# Patient Record
Sex: FEMALE | Race: BLACK OR AFRICAN AMERICAN | NOT HISPANIC OR LATINO | Employment: STUDENT | URBAN - METROPOLITAN AREA
[De-identification: names, ages, dates, MRNs, and addresses within clinical notes are randomized per-mention and may not be internally consistent; named-entity substitution may affect disease eponyms.]

---

## 2018-09-13 ENCOUNTER — OFFICE VISIT (OUTPATIENT)
Dept: OBSTETRICS AND GYNECOLOGY | Facility: CLINIC | Age: 28
End: 2018-09-13
Payer: MEDICAID

## 2018-09-13 VITALS
BODY MASS INDEX: 35.43 KG/M2 | WEIGHT: 192.56 LBS | DIASTOLIC BLOOD PRESSURE: 80 MMHG | HEIGHT: 62 IN | SYSTOLIC BLOOD PRESSURE: 110 MMHG

## 2018-09-13 DIAGNOSIS — N93.8 DUB (DYSFUNCTIONAL UTERINE BLEEDING): Primary | ICD-10-CM

## 2018-09-13 DIAGNOSIS — R93.89 THICKENED ENDOMETRIUM: ICD-10-CM

## 2018-09-13 DIAGNOSIS — N76.2 ACUTE VULVITIS: ICD-10-CM

## 2018-09-13 LAB
B-HCG UR QL: NEGATIVE
CTP QC/QA: YES

## 2018-09-13 PROCEDURE — 88305 TISSUE EXAM BY PATHOLOGIST: CPT | Performed by: PATHOLOGY

## 2018-09-13 PROCEDURE — 99204 OFFICE O/P NEW MOD 45 MIN: CPT | Mod: 25,S$PBB,, | Performed by: OBSTETRICS & GYNECOLOGY

## 2018-09-13 PROCEDURE — 58100 BIOPSY OF UTERUS LINING: CPT | Mod: PBBFAC,PN | Performed by: OBSTETRICS & GYNECOLOGY

## 2018-09-13 PROCEDURE — 88305 TISSUE EXAM BY PATHOLOGIST: CPT | Mod: 26,,, | Performed by: PATHOLOGY

## 2018-09-13 PROCEDURE — 99999 PR PBB SHADOW E&M-NEW PATIENT-LVL III: CPT | Mod: PBBFAC,,, | Performed by: OBSTETRICS & GYNECOLOGY

## 2018-09-13 PROCEDURE — 99203 OFFICE O/P NEW LOW 30 MIN: CPT | Mod: PBBFAC,PN | Performed by: OBSTETRICS & GYNECOLOGY

## 2018-09-13 PROCEDURE — 87660 TRICHOMONAS VAGIN DIR PROBE: CPT

## 2018-09-13 PROCEDURE — 81025 URINE PREGNANCY TEST: CPT | Mod: PBBFAC,PN | Performed by: OBSTETRICS & GYNECOLOGY

## 2018-09-13 RX ORDER — CLOTRIMAZOLE AND BETAMETHASONE DIPROPIONATE 10; .64 MG/G; MG/G
CREAM TOPICAL
Qty: 45 G | Refills: 3 | Status: SHIPPED | OUTPATIENT
Start: 2018-09-13

## 2018-09-13 RX ORDER — NORETHINDRONE 5 MG/1
5 TABLET ORAL DAILY
Qty: 10 TABLET | Refills: 12 | Status: SHIPPED | OUTPATIENT
Start: 2018-09-13 | End: 2018-10-13

## 2018-09-13 NOTE — PROGRESS NOTES
-FINAL PATHOLOGIC DIAGNOSIS  Uterus, endometrial biopsy:  -Poorly developed secretory phase endometrium with glandular and stromal breakdown, negative for hyperplasia or  malignancy.  -Mucous and benign inflamed endocervical epithelium.     9/13/2018    Trichomonas vaginalis Negative   Gardnerella vaginalis Positive (A)   Candida sp Positive (A)   YEAST AND BV    PT SEEN BY OLVIN MULLEN NP IN CLINIC FOR ON/OFF B PELVIC PAIN.  HAD U/S WITH TH ES. PT HAS IRREGULAR MENSES AND OFTEN SKIPS MONTHS.  TX FOR BV AND FINISHED FLAGYL Friday. NOW WITH VULVAR ITCH AND BURNING.    08/15/18 U/S  Uterus:  Size: 9.7 x 3.7 x 4.7 cm  Masses: None  Endometrium: Thickened, measuring 14.6 mm.  Right ovary:  Size: 3.7 x 3.5 x 3 cm  Appearance: Normally distributed follicles.  Vascular flow: Normal.  Left ovary:  Size: 3.4 x 2.6 x 3.5 cm  Appearance: Normally distributed follicles.  Vascular Flow: Normal.  Free Fluid:  None.      Impression     1. Thickened homogeneous endometrium.  Finding of concern for endometrial hyperplasia.  2. No ovarian abnormality seen.     ROS:  GENERAL: No fever, chills, fatigability or weight loss.  VULVAR: No pain, no lesions and no itching.  VAGINAL: No relaxation, no itching, no discharge, no abnormal bleeding and no lesions.  ABDOMEN: No abdominal pain. Denies nausea. Denies vomiting. No diarrhea. No constipation  BREAST: Denies pain. No lumps. No discharge.  URINARY: No incontinence, no nocturia, no frequency and no dysuria.  CARDIOVASCULAR: No chest pain. No shortness of breath. No leg cramps.  NEUROLOGICAL: No headaches. No vision changes.  The remainder of the review of systems was negative.    PE:  General Appearance: overweight And Well developed. Well nourished. In no acute distress.  Vulva: Lesions: No.  Urethral Meatus: Normal size. Normal location. No lesions. No prolapse.  Urethra: No masses. No tenderness. No prolapse. No scarring.  Bladder: No masses. No tenderness.  Vagina: Mucosa NI:yes  discharge no, atrophy no, cystocele no or rectocele no.  Cervix: Lesion: no  Stenotic: no Cervical motion tenderness: no  Uterus: Uterus size: 7 weeks. Support good. Uterus size: Normal  Adnexa: Masses: No Tenderness: No CDS Nodularity: No  Abdomen: overweight No masses. No tenderness.  CHEST: CTA B  HEART: RRR  EXT: NO EDEMA        PROCEDURES:    PLAN:     DIAGNOSIS:  1. DUB (dysfunctional uterine bleeding)    2. Thickened endometrium    3. Acute vulvitis        MEDICATIONS & ORDERS:  Orders Placed This Encounter    Biopsy (Gynecological)    Vaginosis Screen by DNA Probe    POCT Urine Pregnancy    Tissue Specimen To Pathology, Obstetrics/Gynecology    norethindrone (AYGESTIN) 5 mg Tab    clotrimazole-betamethasone 1-0.05% (LOTRISONE) cream    metroNIDAZOLE (FLAGYL) 500 MG tablet    terconazole (TERAZOL 7) 0.4 % Crea     20 MIN D/W PT ON AUB TX AND OPTIONS.    FOLLOW-UP: With me in PRN

## 2018-09-13 NOTE — PROCEDURES
Biopsy (Gynecological)  Date/Time: 2018 4:05 PM  Performed by: Gerrado Waddell Jr., MD  Authorized by: Gerardo Waddell Jr., MD     Consent Done?:  Yes (Written)  Local anesthesia used?: No      Biopsy Location:  Uterus  Estimated blood loss (cc):  2   Patient tolerated the procedure well with no immediate complications.        CC: ENDOMETRIAL BIOPSPY    Ron Devine is a 27 y.o. female  presents for an endometrial biopsy secondary to  es.   UPT is negative.    PRE ENDOMETRIAL BIOPSY COUNSELING:  The patient was informed of the risk of bleeding, infection, uterine perforation and pain and that the test will rule-out endometrial cancer with accuracy greater than 95%. She was counseled on the alternatives to endometrial biopsy and agrees to proceed.    TIME OUT PERFORMED.  The cervix was visualized with a speculum.  A single tooth tenaculum was placed on the anterior lip prior to the biopsy.  A sterile endometrial pipelle was passed without difficulty to a depth of 7 cm.  Scant endometrial tissue was obtained.    The specimen was placed in formalyn and sent to Pathology for histology evaluation. The patient tolerated the procedure well    ASSESSMENT: [facility]    POST ENDOMETRIAL BIOPSY COUNSELING:  Manage post biopsy cramping with NSAIDs or Tyleno.  Expect spotting or light bleeding for a few days.  Report bleeding heavier than a period, fever > 101.0 F, worsening pain or a foul smelling vaginal discharge.    Counseling lasted approximately 15 minutes and all her questions were answered.    FOLLOW-UP: Pending biopsy results.

## 2018-09-14 LAB
CANDIDA RRNA VAG QL PROBE: POSITIVE
G VAGINALIS RRNA GENITAL QL PROBE: POSITIVE
T VAGINALIS RRNA GENITAL QL PROBE: NEGATIVE

## 2018-09-14 RX ORDER — METRONIDAZOLE 500 MG/1
500 TABLET ORAL 2 TIMES DAILY
Qty: 14 TABLET | Refills: 0 | Status: SHIPPED | OUTPATIENT
Start: 2018-09-14 | End: 2018-09-21

## 2018-09-14 RX ORDER — TERCONAZOLE 4 MG/G
1 CREAM VAGINAL NIGHTLY
Qty: 1 TUBE | Refills: 1 | Status: SHIPPED | OUTPATIENT
Start: 2018-09-14 | End: 2018-09-21

## 2018-09-17 ENCOUNTER — TELEPHONE (OUTPATIENT)
Dept: OBSTETRICS AND GYNECOLOGY | Facility: CLINIC | Age: 28
End: 2018-09-17

## 2018-09-17 NOTE — TELEPHONE ENCOUNTER
----- Message from Gerardo Waddell Jr., MD sent at 9/14/2018  6:13 PM CDT -----  CALL WITH BV AND YEAST

## 2018-09-17 NOTE — TELEPHONE ENCOUNTER
----- Message from Coreen Cloud LPN sent at 9/17/2018 12:41 PM CDT -----  Contact: pt      ----- Message -----  From: Marine Morales  Sent: 9/17/2018  12:39 PM  To: [facility]air[facility] MEDINA Jr Staff              Name of Who is Calling: JILL SEE [[id number]]      What is the request in detail:pt calling for test results.. Please advise      Can the clinic reply by [facility]:no      What Number to Call Back if not in [facility]: [phone]

## 2018-09-17 NOTE — TELEPHONE ENCOUNTER
Spoke with pt. Pt notified of bv and yeast. Pt states she has picked up the medication. Pt verbalized understanding.

## 2021-07-05 ENCOUNTER — HOSPITAL ENCOUNTER (EMERGENCY)
Facility: OTHER | Age: 31
Discharge: HOME | End: 2021-07-05
Attending: EMERGENCY MEDICINE
Payer: MEDICAID

## 2021-07-05 VITALS
TEMPERATURE: 99 F | WEIGHT: 178 LBS | BODY MASS INDEX: 33.61 KG/M2 | DIASTOLIC BLOOD PRESSURE: 74 MMHG | OXYGEN SATURATION: 98 % | HEIGHT: 61 IN | RESPIRATION RATE: 17 BRPM | HEART RATE: 68 BPM | SYSTOLIC BLOOD PRESSURE: 120 MMHG

## 2021-07-05 DIAGNOSIS — M79.644 FINGER PAIN, RIGHT: Primary | ICD-10-CM

## 2021-07-05 LAB
B-HCG UR QL: NEGATIVE
CTP QC/QA: YES
HCV AB SERPL QL IA: NEGATIVE
HIV 1+2 AB+HIV1 P24 AG SERPL QL IA: NEGATIVE

## 2021-07-05 PROCEDURE — 25000003 PHARM REV CODE 250: Performed by: PHYSICIAN ASSISTANT

## 2021-07-05 PROCEDURE — 99284 EMERGENCY DEPT VISIT MOD MDM: CPT

## 2021-07-05 PROCEDURE — 81025 URINE PREGNANCY TEST: CPT | Performed by: PHYSICIAN ASSISTANT

## 2021-07-05 PROCEDURE — 87389 HIV-1 AG W/HIV-1&-2 AB AG IA: CPT | Performed by: EMERGENCY MEDICINE

## 2021-07-05 PROCEDURE — 86803 HEPATITIS C AB TEST: CPT | Performed by: EMERGENCY MEDICINE

## 2021-07-05 RX ORDER — KETOROLAC TROMETHAMINE 10 MG/1
10 TABLET, FILM COATED ORAL
Status: COMPLETED | OUTPATIENT
Start: 2021-07-05 | End: 2021-07-05

## 2021-07-05 RX ORDER — AMOXICILLIN AND CLAVULANATE POTASSIUM 875; 125 MG/1; MG/1
1 TABLET, FILM COATED ORAL
Status: COMPLETED | OUTPATIENT
Start: 2021-07-05 | End: 2021-07-05

## 2021-07-05 RX ORDER — KETOROLAC TROMETHAMINE 10 MG/1
10 TABLET, FILM COATED ORAL EVERY 6 HOURS
Qty: 12 TABLET | Refills: 0 | Status: SHIPPED | OUTPATIENT
Start: 2021-07-05 | End: 2021-07-08

## 2021-07-05 RX ORDER — AMOXICILLIN AND CLAVULANATE POTASSIUM 875; 125 MG/1; MG/1
1 TABLET, FILM COATED ORAL 2 TIMES DAILY
Qty: 14 TABLET | Refills: 0 | Status: SHIPPED | OUTPATIENT
Start: 2021-07-05

## 2021-07-05 RX ADMIN — KETOROLAC TROMETHAMINE 10 MG: 10 TABLET, FILM COATED ORAL at 11:07

## 2021-07-05 RX ADMIN — AMOXICILLIN AND CLAVULANATE POTASSIUM 1 TABLET: 875; 125 TABLET, FILM COATED ORAL at 11:07

## 2022-02-01 ENCOUNTER — TELEPHONE (OUTPATIENT)
Dept: PRIMARY CARE CLINIC | Facility: CLINIC | Age: 32
End: 2022-02-01
Payer: MEDICAID

## 2022-02-01 NOTE — TELEPHONE ENCOUNTER
----- Message from Pilar Argueat sent at 2022  7:44 AM CST -----  Regarding: Same Day Appointment  Appointment Request  Ron Devine  Sent:   5:25 PM  To: [facility]     Ron Devine  MRN: [id number] : 1990  Pt Home: [phone]    Entered: [phone]        Message    Appointment Request From: Ron Devine    With Provider: Gisela Rollins    Preferred Date Range: 2022 - 2022    Preferred Times: Any Time    Reason for visit: Back / Neck injury    Comments:  Back and neck injury at work. Back and neck aches and sore.

## 2022-07-20 NOTE — TELEPHONE ENCOUNTER
Caller: Kami Wallis    Relationship: Emergency Contact    Best call back number: [phone]    What is the medical concern/diagnosis: PATIENT IN WHEEL CHAIR, NEEDS HELP WITH KNEE AND BACK     What specialty or service is being requested: HOME HEALTH PHYSICAL THERAPY AND NURSING     What is the provider, practice or medical service name: [facility]    Spoke with pt. Notified that the soonest available apt. For a New pt. Is going to be 3/31 with Dr. Shelton pt. Declined will find a sooner apt.

## 2023-04-19 ENCOUNTER — PATIENT MESSAGE (OUTPATIENT)
Dept: RESEARCH | Facility: HOSPITAL | Age: 33
End: 2023-04-19
Payer: MEDICAID

## 2024-09-03 ENCOUNTER — CLINICAL SUPPORT (OUTPATIENT)
Dept: OTHER | Facility: CLINIC | Age: 34
End: 2024-09-03
Payer: COMMERCIAL

## 2024-09-03 DIAGNOSIS — Z00.8 ENCOUNTER FOR OTHER GENERAL EXAMINATION: ICD-10-CM

## 2024-09-03 PROCEDURE — 99401 PREV MED CNSL INDIV APPRX 15: CPT | Mod: S$GLB,,, | Performed by: INTERNAL MEDICINE

## 2024-09-03 PROCEDURE — 82947 ASSAY GLUCOSE BLOOD QUANT: CPT | Mod: QW,S$GLB,, | Performed by: INTERNAL MEDICINE

## 2024-09-03 PROCEDURE — 80061 LIPID PANEL: CPT | Mod: QW,S$GLB,, | Performed by: INTERNAL MEDICINE

## 2024-09-04 VITALS
DIASTOLIC BLOOD PRESSURE: 75 MMHG | HEIGHT: 60 IN | BODY MASS INDEX: 37.5 KG/M2 | SYSTOLIC BLOOD PRESSURE: 121 MMHG | WEIGHT: 191 LBS

## 2024-09-04 LAB
GLUCOSE SERPL-MCNC: 90 MG/DL (ref 60–140)
HDLC SERPL-MCNC: 58 MG/DL
POC CHOLESTEROL, LDL (DOCK): 80 MG/DL
POC CHOLESTEROL, TOTAL: 158 MG/DL
TRIGL SERPL-MCNC: 110 MG/DL

## 2025-02-12 ENCOUNTER — HOSPITAL ENCOUNTER (EMERGENCY)
Facility: OTHER | Age: 35
Discharge: HOME | End: 2025-02-12
Attending: EMERGENCY MEDICINE
Payer: COMMERCIAL

## 2025-02-12 VITALS
OXYGEN SATURATION: 98 % | RESPIRATION RATE: 18 BRPM | DIASTOLIC BLOOD PRESSURE: 72 MMHG | BODY MASS INDEX: 36.52 KG/M2 | SYSTOLIC BLOOD PRESSURE: 129 MMHG | TEMPERATURE: 98 F | WEIGHT: 186 LBS | HEART RATE: 78 BPM | HEIGHT: 60 IN

## 2025-02-12 DIAGNOSIS — M23.91 INTERNAL DERANGEMENT OF RIGHT KNEE: ICD-10-CM
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
## 2025-02-12 DIAGNOSIS — M25.561 RIGHT KNEE PAIN: ICD-10-CM

## 2025-02-12 LAB
B-HCG UR QL: NEGATIVE
CTP QC/QA: YES

## 2025-02-12 PROCEDURE — 99283 EMERGENCY DEPT VISIT LOW MDM: CPT | Mod: 25

## 2025-02-12 PROCEDURE — 25000003 PHARM REV CODE 250: Performed by: EMERGENCY MEDICINE

## 2025-02-12 PROCEDURE — 81025 URINE PREGNANCY TEST: CPT | Performed by: EMERGENCY MEDICINE

## 2025-02-12 RX ORDER — IBUPROFEN 600 MG/1
600 TABLET ORAL
Status: COMPLETED | OUTPATIENT
Start: 2025-02-12 | End: 2025-02-12

## 2025-02-12 RX ADMIN — IBUPROFEN 600 MG: 600 TABLET, FILM COATED ORAL at 01:02

## 2025-02-12 NOTE — ED TRIAGE NOTES
Right knee pain difficulty walking after playing with children on monkey bars, when she let lose she reports she felt something in her right knee crack when she landed.

## 2025-02-12 NOTE — ED PROVIDER NOTES
"Emergency Department Encounter  Provider Note    Ron Devine  [id number]  2025    Evaluation:    History Acquisition:     Chief Complaint   Patient presents with    Knee Pain     Was playing with kids at playground today, was on the monkey bars and when she let go, she came down and felt something crack in her right knee.        History of Present Illness:  Ron Devine is a 34 y.o. female who has a past medical history of Migraines.    The patient presents to the ED due to R knee pain.   Patient reports she was climbing on the monkey bars with her children earlier today. She let go and landed on the ground and reports hearing a "pop" in her knee.   Since then she has had pain and swelling to the R knee. She denies any additional injury. No weakness or numbness. No foot, ankle, or hip pain. She applied an ice pack prior to arrival. She has not taken anything for pain.     Additional historians utilized:  none    Prior medical records were reviewed:   Office visit 2024 for follow-up obesity, menorrhagia    The patient's list of active medical history, family/social history, medications, and allergies as documented has been reviewed.     Past Medical History:   Diagnosis Date    Migraines      Past Surgical History:   Procedure Laterality Date     SECTION  2009    X 2    DILATION AND CURETTAGE OF UTERUS      EAB X 2    TUBAL LIGATION       Family History   Problem Relation Name Age of Onset    Breast cancer Neg Hx      Cancer Neg Hx      Colon cancer Neg Hx      Ovarian cancer Neg Hx       Social History     Socioeconomic History    Marital status: Single   Tobacco Use    Smoking status: Never   Substance and Sexual Activity    Alcohol use: Yes     Comment: OCCASIONAL    Drug use: Not Currently     Types: Marijuana     Comment: "every now and then"    Sexual activity: Yes     Partners: Male     Birth control/protection: None       Medications:  Medication List with Changes/Refills "   Current Medications    AMOXICILLIN-CLAVULANATE 875-125MG (AUGMENTIN) 875-125 MG PER TABLET    Take 1 tablet by mouth 2 (two) times daily.    CLOTRIMAZOLE-BETAMETHASONE 1-0.05% (LOTRISONE) CREAM    Apply to affected area 2 times daily    NORETHINDRONE (AYGESTIN) 5 MG TAB    Take 1 tablet (5 mg total) by mouth once daily. TAKE D 1-10 Q MONTH    ONDANSETRON (ZOFRAN-ODT) 4 MG TBDL    Take 1 tablet (4 mg total) by mouth every 8 (eight) hours as needed (Nausea/Vomiting).    PRENATAL #48-IRON CB&GLU-FA-B6 ORAL    Take by mouth.       Allergies:  Review of patient's allergies indicates:  No Known Allergies      Physical Exam:     Initial Vitals [02/12/25 1237]   BP Pulse Resp Temp SpO2   134/66 81 16 98.1 °F (36.7 °C) 99 %      MAP       --         Physical Exam    Constitutional: She appears well-developed and well-nourished. She is not diaphoretic. No distress.   HENT:   Head: Normocephalic and atraumatic.   Cardiovascular:  Normal rate.           Pulmonary/Chest: No respiratory distress.   Musculoskeletal:         General: Tenderness present. No edema. Normal range of motion.      Right knee: Swelling, effusion and bony tenderness present. No deformity or crepitus. Normal range of motion. Tenderness present over the patellar tendon. Normal alignment. Normal pulse.     Neurological: She is alert and oriented to person, place, and time.   Skin: Skin is warm and dry.   Psychiatric: She has a normal mood and affect. Her behavior is normal. Judgment and thought content normal.         Differential Diagnoses:   Based on available information and initial assessment, Differential Diagnosis includes, but is not limited to:  Fracture, dislocation, compartment syndrome, nerve injury/palsy, vascular injury, DVT, rhabdomyolysis, hemarthrosis, septic joint, cellulitis, bursitis, muscle strain, ligament tear/sprain, laceration, foreign body, abrasion, soft tissue contusion, osteoarthritis.      ED Management:   Procedures    Orders  Placed This Encounter    ORTHOPEDIC BRACING FOR HOME USE - LOWER EXTREMITY    X-Ray Knee 3 View Right    Ambulatory referral/consult to Orthopedics    Ice to affected area    Notify Provider if unable to obtain within 30 minutes of assessment    POCT urine pregnancy    ibuprofen tablet 600 mg          EKG:       Labs:     Labs Reviewed   POCT URINE PREGNANCY       Result Value    POC Preg Test, Ur Negative       Acceptable Yes       Independent review of the labs ordered include:   See ED course    Imaging:     Imaging Results              X-Ray Knee 3 View Right (Final result)  Result time 02/12/25 13:30:24      Final result by Kyler Segovia MD (02/12/25 13:30:24)                   Impression:      1. No acute displaced fracture or dislocation of the knee.      Electronically signed by: Kyler Segovia MD  Date:    02/12/2025  Time:    13:30               Narrative:    EXAMINATION:  XR KNEE 3 VIEW RIGHT    CLINICAL HISTORY:  Pain in right knee    TECHNIQUE:  AP, lateral, and Merchant views of the right knee were performed.    COMPARISON:  None    FINDINGS:  Three views right knee.    No acute displaced fracture or dislocation of the knee.  No radiopaque foreign body.  No significant edema.  No large knee joint effusion.                                         Medications Given:     Medications   ibuprofen tablet 600 mg (600 mg Oral Given 2/12/25 1344)        Medical Decision Making:    Additional Consideration:   Additional testing considered during clinical course: none    Social determinants of health considered during development of treatment plan include: poor access to care    Current co-morbidities considered which impacted clinical decision making: none    Case discussed with additional provider: none    ED Course as of 02/12/25 1346   Wed Feb 12, 2025   1324 SpO2: 99 % [SS]   1324 Resp: 16 [SS]   1324 Pulse: 81 [SS]   1324 Temp Source: Oral [SS]   1324 Temp: 98.1 °F (36.7 °C) [SS]    1324 BP: 134/66  Vitals reassuring [SS]   1324 X-Ray Knee 3 View Right  Knee x-ray independently interpreted: no fracture or dislocation.  Agree with radiologist interpretation.    [SS]      ED Course User Index  [SS] Blade Vazquez MD            Medical Decision Making  34-year-old female with right knee pain after fall.  X-rays without fracture.  Will discharge with supportive care and ortho referral due to possible internal derangement/ligamentous injury.    Problems Addressed:  Fall from playground equipment, initial encounter: acute illness or injury  Internal derangement of right knee: acute illness or injury  Right knee pain: acute illness or injury    Amount and/or Complexity of Data Reviewed  Labs: ordered. Decision-making details documented in ED Course.  Radiology: ordered and independent interpretation performed. Decision-making details documented in ED Course.    Risk  OTC drugs.  Prescription drug management.  Diagnosis or treatment significantly limited by social determinants of health.        Clinical Impression:       ICD-10-CM ICD-9-CM   1. Fall from playground equipment, initial encounter  W09.8XXA E884.0   2. Right knee pain  M25.561 719.46   3. Internal derangement of right knee  M23.91 717.9         Follow-up Information       Follow up With Specialties Details Why Contact Info    Gisela Rollins NP Family Medicine Schedule an appointment as soon as possible for a visit   [address] [place] [address]      Orthopedics, [facility] Orthopedic Surgery Schedule an appointment as soon as possible for a visit   [address] [facility] [address] [phone]               ED Disposition Condition    Discharge Stable              On re-evaluation, the patient's status has improved.  PCP/Ortho follow-up as soon as possible was recommended.    After taking into careful account the patient's history, physical exam findings, as well as empirical and objective data  obtained throughout ED workup, I feel no emergent medical condition has been identified. No further evaluation or admission was felt to be required, and the patient is stable for discharge from the ED. The patient and any additional family present were updated with test results, overall clinical impression, and recommended further plan of care, including discharge instructions as provided and outpatient follow-up for continued evaluation and management as needed. All questions were answered. The patient expressed understanding and agreed with current plan for discharge and follow-up plan of care. Strict ED return precautions were provided, including return/worsening of current symptoms, new symptoms, or any other concerns.       Blade Vazquez MD  02/13/25 0469

## 2025-02-12 NOTE — Clinical Note
"Ron Wood" Nilson was seen and treated in our emergency department on 2/12/2025.  She may return to work on 02/17/2025.       If you have any questions or concerns, please don't hesitate to call.      leona MELENDEZ    "

## 2025-02-12 NOTE — Clinical Note
"Ron Wood" Nilson was seen and treated in our emergency department on 2/12/2025.  She may return to work on 02/14/2025.       If you have any questions or concerns, please don't hesitate to call.      leona MELENDEZ    "

## 2025-02-12 NOTE — DISCHARGE INSTRUCTIONS
Thank you for choosing [facility]!     Our goal in the Emergency Department is to always provide outstanding medical care. You may receive a survey by mail or e-mail in the next week regarding your experience today. We would greatly appreciate you completing and returning the survey. Your feedback provides us with a way to recognize our staff who provide very good care, and it helps us learn how to improve when your experience was below our aspiration of excellence.      It is important to remember that some problems are difficult to diagnose and may not be found during your first visit. Be sure to follow up with your primary care doctor and review any labs/imaging that was performed during your visit with them. If you do not have a primary care doctor, you may contact the one listed on your discharge paperwork, or you may also call the [facility] Appointment Desk at [phone] to schedule an appointment.     All medications may potentially have side effects and it is impossible to predict which medications may give you side effects. If you feel that you are having a negative effect of any medication you should immediately stop taking them and seek medical attention.  Do not drive or make any important decisions for 24 hours if you have received any pain medications, sedatives or mood altering drugs during your ER visit.    We appreciate you trusting us with your medical care. We will be happy to take care of you for all of your future medical needs. You may return to the ER at any time for any new/concerning symptoms, worsening condition, or failure to improve. We hope you feel better soon.     Sincerely,    Blade Vazquez Jr., MD  Board-Certified Emergency Medicine Physician  [facility]

## 2025-02-13 ENCOUNTER — OFFICE VISIT (OUTPATIENT)
Dept: ORTHOPEDICS | Facility: CLINIC | Age: 35
End: 2025-02-13
Payer: COMMERCIAL

## 2025-02-13 VITALS — WEIGHT: 186.06 LBS | HEIGHT: 60 IN | BODY MASS INDEX: 36.53 KG/M2
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
## 2025-02-13 DIAGNOSIS — M25.561 RIGHT KNEE PAIN: ICD-10-CM

## 2025-02-13 DIAGNOSIS — S83.411A SPRAIN OF MEDIAL COLLATERAL LIGAMENT OF RIGHT KNEE, INITIAL ENCOUNTER: Primary | ICD-10-CM

## 2025-02-13 PROCEDURE — 3008F BODY MASS INDEX DOCD: CPT | Mod: CPTII,S$GLB,,

## 2025-02-13 PROCEDURE — 99213 OFFICE O/P EST LOW 20 MIN: CPT | Mod: S$GLB,,,

## 2025-02-13 PROCEDURE — 99999 PR PBB SHADOW E&M-EST. PATIENT-LVL III: CPT | Mod: PBBFAC,,,

## 2025-02-13 PROCEDURE — 1160F RVW MEDS BY RX/DR IN RCRD: CPT | Mod: CPTII,S$GLB,,

## 2025-02-13 PROCEDURE — 1159F MED LIST DOCD IN RCRD: CPT | Mod: CPTII,S$GLB,,

## 2025-02-13 NOTE — PROGRESS NOTES
SUBJECTIVE:     Chief Complaint & History of Present Illness:  History of Present Illness    CHIEF COMPLAINT:  - Right knee injury    HPI:  Ms. Devine presents for evaluation of a knee injury that occurred yesterday while playing outside. She reports attempting to jump down from monkey bars, resulting in her knee twisting outward as her body went in the opposite direction.Initially, she could put little to no pressure on the affected leg, but notes some improvement since yesterday, reporting she can now put a little pressure on it. She describes pain in the knee area diffusely. She mentions feeling as though her kneecap may have dislocated during the incident. Pain is both in the front and back of the knee, with the most significant discomfort in the back. She denies any recent knee issues, though mentions having had knee problems in high school. Ms. Devine was seen in the Emergency Department yesterday, where she was given a compressive wrap and ibuprofen. She reports using a double brace, combining the ED-provided brace with one she already owned. She notes some decrease in swelling since yesterday. She has been using crutches for ambulation.     PREVIOUS TREATMENTS:  - Ms. Devine was given an HVAN brace in the ED yesterday  - Ms. Devine had a pre-existing brace which she used in addition to the HVAN brace (double braced)  - Ms. Devine has been using ice and elevation since the ED visit yesterday, with minimal improvement noted      ROS:  Musculoskeletal: +joint pain          Past Medical History:   Diagnosis Date    Migraines        Past Surgical History:   Procedure Laterality Date     SECTION  2009    X 2    DILATION AND CURETTAGE OF UTERUS      EAB X 2    TUBAL LIGATION         Family History   Problem Relation Name Age of Onset    Breast cancer Neg Hx      Cancer Neg Hx      Colon cancer Neg Hx      Ovarian cancer Neg Hx         Review of patient's allergies indicates:  No Known Allergies        Current  Outpatient Medications:     amoxicillin-clavulanate 875-125mg (AUGMENTIN) 875-125 mg per tablet, Take 1 tablet by mouth 2 (two) times daily., Disp: 14 tablet, Rfl: 0    clotrimazole-betamethasone 1-0.05% (LOTRISONE) cream, Apply to affected area 2 times daily, Disp: 45 g, Rfl: 3    ondansetron (ZOFRAN-ODT) 4 MG TbDL, Take 1 tablet (4 mg total) by mouth every 8 (eight) hours as needed (Nausea/Vomiting)., Disp: 12 tablet, Rfl: 0    PRENATAL #48-IRON CB&GLU-FA-B6 ORAL, Take by mouth., Disp: , Rfl:     norethindrone (AYGESTIN) 5 mg Tab, Take 1 tablet (5 mg total) by mouth once daily. TAKE D 1-10 Q MONTH, Disp: 10 tablet, Rfl: 12  No current facility-administered medications for this visit.      OBJECTIVE:     PHYSICAL EXAM:  Ht 5' (1.524 m)   Wt 84.4 kg (186 lb 1.1 oz)   LMP 02/12/2025   BMI 36.34 kg/m²   General: Pleasant, cooperative, NAD.  HEENT: NCAT, sclera nonicteric.  Lungs: Respirations are equal and unlabored.   Abdomen: Soft and non-tender.  CV: 2+ bilateral upper and lower extremity pulses.  Neuro: Sensation intact to light touch.  Skin: Intact throughout LE with no rashes, erythema, or lesions.  Extremities: No LE edema, NVI lower extremities. antalgic gait. Using crutches.     right Knee Exam:  Knee Range of Motion: 0-90   Effusion: moderate  Condition of skin: intact  Location of tenderness: Medial joint line, Lateral joint line, and posterior    Strength: 4/5 quadriceps strength, 4/5 gastroc-soleus strength, 4/5 hamstring strength, and 4/5 tibialis anterior strength  Stability: stable to testing, Lachman: stable, and MCL: grade II  Knee Alignment:  Mild valgus  Meir: positive     left Knee Exam:  Knee Range of Motion: 0-130   Effusion: none  Condition of skin: intact  Location of tenderness: None   Strength: 5/5 quadriceps strength, 5/5 gastroc-soleus strength, 5/5 hamstring strength, and 5/5 tibialis anterior strength  Knee alignment:  Mild valgus  Stability: stable to testing  Meir:  negative    RADIOGRAPHS:  X-rays of the right knee taken previously were reviewed. Imaging reveals no acute fractures or dislocations. Satisfactory preservation of joint spacing.      ASSESSMENT:       ICD-10-CM ICD-9-CM   1. Sprain of medial collateral ligament of right knee, initial encounter  S83.411A 844.1   2. Right knee pain  M25.561 719.46   3. Fall from playground equipment, initial encounter  W09.8XXA E884.0       PLAN:     We discussed with the patient at length all the different treatment options available including anti-inflammatories, acetaminophen, rest, ice, knee strengthening exercise, occasional cortisone injections for temporary relief, Viscosupplimentation injections, arthroscopic debridement, osteotomy, and finally knee arthroplasty.     Assessment & Plan    - Laxity with Lachman and MCL testing: possible tear/sprain  - Fitted patient for a T-Scope brace. Completely open no flexion precautions.   - Use Ace Wrap for compression if swelling persists.  - Apply ice and elevate the lower extremity.  - Ordered MRI Right Knee to evaluate for soft tissue injury, particularly MCL and ACL.  - Follow up after MRI results are available.  - Possible referral to sports medicine pending results.   - Crutches as needed for partial weightbearing.           This note was generated with the assistance of ambient listening technology. Verbal consent was obtained by the patient and accompanying visitor(s) for the recording of patient appointment to facilitate this note. I attest to having reviewed and edited the generated note for accuracy, though some syntax or spelling errors may persist. Please contact the author of this note for any clarification.         Todd Jarvis PA-C

## 2025-02-19 ENCOUNTER — HOSPITAL ENCOUNTER (OUTPATIENT)
Dept: RADIOLOGY | Facility: OTHER | Age: 35
Discharge: HOME | End: 2025-02-19
Payer: COMMERCIAL

## 2025-02-19 DIAGNOSIS — S83.411A SPRAIN OF MEDIAL COLLATERAL LIGAMENT OF RIGHT KNEE, INITIAL ENCOUNTER: ICD-10-CM

## 2025-02-19 PROCEDURE — 73721 MRI JNT OF LWR EXTRE W/O DYE: CPT | Mod: TC,RT

## 2025-02-20 DIAGNOSIS — S83.511A RUPTURE OF ANTERIOR CRUCIATE LIGAMENT OF RIGHT KNEE, INITIAL ENCOUNTER: Primary | ICD-10-CM

## 2025-02-20 DIAGNOSIS — S83.411A SPRAIN OF MEDIAL COLLATERAL LIGAMENT OF RIGHT KNEE, INITIAL ENCOUNTER: ICD-10-CM

## 2025-03-11 ENCOUNTER — OFFICE VISIT (OUTPATIENT)
Dept: SPORTS MEDICINE | Facility: CLINIC | Age: 35
End: 2025-03-11
Payer: COMMERCIAL

## 2025-03-11 VITALS
SYSTOLIC BLOOD PRESSURE: 116 MMHG | BODY MASS INDEX: 38.48 KG/M2 | HEART RATE: 91 BPM | WEIGHT: 196 LBS | HEIGHT: 60 IN | DIASTOLIC BLOOD PRESSURE: 80 MMHG

## 2025-03-11 DIAGNOSIS — S83.511A RUPTURE OF ANTERIOR CRUCIATE LIGAMENT OF RIGHT KNEE, INITIAL ENCOUNTER: ICD-10-CM

## 2025-03-11 DIAGNOSIS — S83.261D PERIPHERAL TEAR OF LATERAL MENISCUS OF RIGHT KNEE AS CURRENT INJURY, SUBSEQUENT ENCOUNTER: Primary | ICD-10-CM

## 2025-03-11 DIAGNOSIS — M25.461 EFFUSION OF BURSA OF RIGHT KNEE: ICD-10-CM

## 2025-03-11 PROCEDURE — 99999 PR PBB SHADOW E&M-EST. PATIENT-LVL III: CPT | Mod: PBBFAC,,, | Performed by: ORTHOPAEDIC SURGERY

## 2025-03-11 RX ORDER — MELOXICAM 7.5 MG/1
7.5 TABLET ORAL DAILY
Qty: 30 TABLET | Refills: 3 | Status: SHIPPED | OUTPATIENT
Start: 2025-03-11

## 2025-03-11 NOTE — PROCEDURES
Large Joint Aspiration/Injection: R knee    Date/Time: 3/11/2025 12:30 PM    Performed by: Blade Blount MD  Authorized by: Blade Blount MD    Consent Done?:  Yes (Verbal)  Indications:  Pain  Site marked: the procedure site was marked    Timeout: prior to procedure the correct patient, procedure, and site was verified    Prep: patient was prepped and draped in usual sterile fashion      Details:  Needle Size:  22 G  Ultrasonic Guidance for needle placement?: No    Approach:  Superior  Location:  Knee  Site:  R knee  Aspirate amount (mL):  25  Aspirate:  Serous  Patient tolerance:  Patient tolerated the procedure well with no immediate complications

## 2025-03-11 NOTE — PROGRESS NOTES
SUBJECTIVE:     Chief Complaint & History of Present Illness:  History of Present Illness    CHIEF COMPLAINT:  - Right knee injury    34 y.o. female presents for MRI review of right knee.  Concern for ACL tear. Patient does not report any new incidents or injuries since their last appointment. Pain and symptoms remain unchanged since his last appointment. Here today to discuss treatment options.       Initial Hx:   Ms. Devine presents for evaluation of a knee injury that occurred yesterday while playing outside. She reports attempting to jump down from monkey bars, resulting in her knee twisting outward as her body went in the opposite direction.Initially, she could put little to no pressure on the affected leg, but notes some improvement since yesterday, reporting she can now put a little pressure on it. She describes pain in the knee area diffusely. She mentions feeling as though her kneecap may have dislocated during the incident. Pain is both in the front and back of the knee, with the most significant discomfort in the back. She denies any recent knee issues, though mentions having had knee problems in high school. Ms. Devine was seen in the Emergency Department yesterday, where she was given a compressive wrap and ibuprofen. She reports using a double brace, combining the ED-provided brace with one she already owned. She notes some decrease in swelling since yesterday. She has been using crutches for ambulation.     PREVIOUS TREATMENTS:  - Ms. Devine was given an HVAN brace in the ED yesterday  - Ms. Devine had a pre-existing brace which she used in addition to the HVAN brace (double braced)  - Ms. Devine has been using ice and elevation since the ED visit yesterday, with minimal improvement noted      ROS:  Musculoskeletal: +joint pain          Past Medical History:   Diagnosis Date    Migraines        Past Surgical History:   Procedure Laterality Date     SECTION  2009    X 2    DILATION AND CURETTAGE  OF UTERUS      EAB X 2    TUBAL LIGATION         Family History   Problem Relation Name Age of Onset    Breast cancer Neg Hx      Cancer Neg Hx      Colon cancer Neg Hx      Ovarian cancer Neg Hx         Review of patient's allergies indicates:  No Known Allergies        Current Outpatient Medications:     amoxicillin-clavulanate 875-125mg (AUGMENTIN) 875-125 mg per tablet, Take 1 tablet by mouth 2 (two) times daily. (Patient not taking: Reported on 3/11/2025), Disp: 14 tablet, Rfl: 0    clotrimazole-betamethasone 1-0.05% (LOTRISONE) cream, Apply to affected area 2 times daily (Patient not taking: Reported on 3/11/2025), Disp: 45 g, Rfl: 3    norethindrone (AYGESTIN) 5 mg Tab, Take 1 tablet (5 mg total) by mouth once daily. TAKE D 1-10 Q MONTH, Disp: 10 tablet, Rfl: 12    ondansetron (ZOFRAN-ODT) 4 MG TbDL, Take 1 tablet (4 mg total) by mouth every 8 (eight) hours as needed (Nausea/Vomiting). (Patient not taking: Reported on 3/11/2025), Disp: 12 tablet, Rfl: 0    PRENATAL #48-IRON CB&GLU-FA-B6 ORAL, Take by mouth. (Patient not taking: Reported on 3/11/2025), Disp: , Rfl:       OBJECTIVE:     PHYSICAL EXAM:  /80   Pulse 91   Ht 5' (1.524 m)   Wt 88.9 kg (195 lb 15.8 oz)   LMP 02/12/2025   BMI 38.28 kg/m²   General: Pleasant, cooperative, NAD.  HEENT: NCAT, sclera nonicteric.  Lungs: Respirations are equal and unlabored.   Abdomen: Soft and non-tender.  CV: 2+ bilateral upper and lower extremity pulses.  Neuro: Sensation intact to light touch.  Skin: Intact throughout LE with no rashes, erythema, or lesions.  Extremities: No LE edema, NVI lower extremities. antalgic gait. Using crutches.     right Knee Exam:  Knee Range of Motion: 0-90   Effusion: moderate  Condition of skin: intact  Location of tenderness: Medial joint line, Lateral joint line, and posterior    Strength: 4/5 quadriceps strength, 4/5 gastroc-soleus strength, 4/5 hamstring strength, and 4/5 tibialis anterior strength  Stability: stable to  testing, Lachman: stable, and MCL: grade II  Knee Alignment:  Mild valgus  Meir: positive     left Knee Exam:  Knee Range of Motion: 0-130   Effusion: none  Condition of skin: intact  Location of tenderness: None   Strength: 5/5 quadriceps strength, 5/5 gastroc-soleus strength, 5/5 hamstring strength, and 5/5 tibialis anterior strength  Knee alignment:  Mild valgus  Stability: stable to testing  Meir: negative    RADIOGRAPHS:  X-rays of the right knee taken previously were reviewed. Imaging reveals no acute fractures or dislocations. Satisfactory preservation of joint spacing.    Results for orders placed or performed during the hospital encounter of 02/19/25 (from the past 2160 hours)   MRI Knee Without Contrast Right    Impression    Rupture of the ACL with osseous contusion posterolateral and posteromedial tibial margin.    Longitudinal tear posterior horn lateral meniscus.    Grade 2-3 injury of the MCL with loss of tension.      Electronically signed by: Robert Huffman MD  Date:    02/20/2025  Time:    07:05         ASSESSMENT:       ICD-10-CM ICD-9-CM   1. Rupture of anterior cruciate ligament of right knee, initial encounter  S83.511A 844.2       PLAN:       25 cc aspiration   Treatment options were discussed with the patient about her knee.  I reviewed the MRI images and relevant anatomy with her and what this means for her knee.    We discussed both non-operative and operative options for her knee and the risks and benefits of each.    I feel like she would benefit from ACL reconstruction for her knee.  After a discussion of specific risks and benefits, she would like to proceed with setting this up.      These risks include: patella fracture, patella tendonitis, instability or re-tear, bleeding, infection, scarring, damage to neurovascular structures, damage to cartilage, stiffness, blood clots, pulmonary embolism, swelling, compartment syndrome, need for further surgery, and the risks of  anesthesia.      She verbalized her understanding of these risks and wished to proceed with surgery.    We discussed the common graft options including bone-patella tendon-bone and hamstring autografts, as well as the risks and benefits of allografts.    She verbalized her understanding of the different graft options and would like to use BTB autograft for her ACL reconstruction.    A total of 25 minutes were spent face-to-face with the patient during this encounter and over half of that time was spent on counseling about treatment options including her choice for surgery, graft choice and coordination of her care for her preoperative visits, surgery and post-operative rehab.  We also had a detailed discussion of her expectation level for this procedure as well as any limitations at home or work and with exercising following surgery.     The operative plan is:    right   1. ACL reconstruction with ipsilateral bone-patella tendon-bone autograft  2. Possible arthroscopic meniscus repair  3. Possible arthroscopic meniscectomy  4. Possible arthroscopic cartilage repair  5. Possible arthroscopic debridement  6. Possible arthroscopic microfracture     Patient will not  need medical clearance prior to the pre-operative appointment.    If she wishes to proceed, we'll get her scheduled for this at her convenience around her work schedule.

## 2025-03-12 ENCOUNTER — PATIENT MESSAGE (OUTPATIENT)
Dept: SPORTS MEDICINE | Facility: CLINIC | Age: 35
End: 2025-03-12
Payer: COMMERCIAL

## 2025-03-17 DIAGNOSIS — S83.511A RUPTURE OF ANTERIOR CRUCIATE LIGAMENT OF RIGHT KNEE, INITIAL ENCOUNTER: Primary | ICD-10-CM

## 2025-03-17 DIAGNOSIS — S83.261D PERIPHERAL TEAR OF LATERAL MENISCUS OF RIGHT KNEE AS CURRENT INJURY, SUBSEQUENT ENCOUNTER: ICD-10-CM

## 2025-04-02 ENCOUNTER — PATIENT MESSAGE (OUTPATIENT)
Dept: SPORTS MEDICINE | Facility: CLINIC | Age: 35
End: 2025-04-02
Payer: COMMERCIAL

## 2025-04-02 DIAGNOSIS — S83.511A RUPTURE OF ANTERIOR CRUCIATE LIGAMENT OF RIGHT KNEE, INITIAL ENCOUNTER: Primary | ICD-10-CM

## 2025-05-02 ENCOUNTER — PATIENT MESSAGE (OUTPATIENT)
Dept: PREADMISSION TESTING | Facility: HOSPITAL | Age: 35
End: 2025-05-02
Payer: COMMERCIAL

## 2025-05-02 NOTE — ANESTHESIA PAT ROS NOTE
2025  Ron Devine is a 34 y.o., female.      Pre-op Assessment    I have reviewed the Patient Summary Reports.       I have reviewed the Medications.     Review of Systems  Social:  Alcohol Use, Non-Smoker       Hematology/Oncology:  Hematology Normal   Oncology Normal                                   EENT/Dental:  EENT/Dental Normal           Cardiovascular:  Cardiovascular Normal Exercise tolerance: good                                             Pulmonary:  Pulmonary Normal                       Renal/:  Renal/ Normal                 Hepatic/GI:  Hepatic/GI Normal                    Musculoskeletal:       Rupture of anterior cruciate ligament of right knee, initial encounter    Peripheral tear of lateral meniscus of right knee as current injury, subsequent encounter              OB/GYN/PEDS:  H/o dysfunctional uterine bleeding and breast lump   x2, D&C x2, Tubal ligation           Neurological:      Headaches (migraines)     H/o Bell's Palsy                            Endocrine:        Obesity / BMI > 30 (BMI-38.1)  Dermatological:  Skin Normal    Psych:  Psychiatric Normal                     Past Medical History:   Diagnosis Date    Migraines      Past Surgical History:   Procedure Laterality Date     SECTION  2009    X 2    DILATION AND CURETTAGE OF UTERUS      EAB X 2    TUBAL LIGATION           Anesthesia Assessment: Preoperative EQUATION    Planned Procedure: Procedure(s) (LRB):  RECONSTRUCTION, KNEE, ACL, ARTHROSCOPIC (Right)  ARTHROSCOPY,KNEE,WITH MENISCUS REPAIR (Right)  ARTHROSCOPY, KNEE, WITH MENISCECTOMY (Right)  Requested Anesthesia Type:General  Surgeon: Blade Blount MD  Service: Orthopedics  Known or anticipated Date of Surgery:2025    Surgeon notes: reviewed    Electronic QUestionnaire Assessment completed via nurse interview with  patient.        Triage considerations:     The patient has no apparent active cardiac condition (No unstable coronary Syndrome such as severe unstable angina or recent [<1 month] myocardial infarction, decompensated CHF, severe valvular   disease or significant arrhythmia)    Previous anesthesia records:Not available    Last PCP note: > 1 year ago   Subspecialty notes: Ortho    Other important co-morbidities: Obesity      Tests already available:  No recent tests.             Instructions given. (See in Nurse's note)    Optimization:  Anesthesia Preop Clinic Assessment not Indicated    Medical Opinion not Indicated       Sub-specialist consult indicated:  no       Plan:     Consultation:clearance not requested    Patient  has previously scheduled Medical Appointment:    Navigation: Okay to proceed at [facility]              Straight Line to surgery.               No tests, anesthesia preop clinic visit, or consult required.    Ht:5'  Wt:195lbs  BMI:38.1

## 2025-05-15 ENCOUNTER — TELEPHONE (OUTPATIENT)
Dept: SPORTS MEDICINE | Facility: CLINIC | Age: 35
End: 2025-05-15
Payer: COMMERCIAL

## 2025-05-15 NOTE — TELEPHONE ENCOUNTER
Spoke with patient in regards to arrival time for surgery for Monday 5/19 for 5am. Informed patient to be to our [place] location building [address] first floor. Patient understood and had no further questions

## 2025-05-16 ENCOUNTER — ANESTHESIA EVENT (OUTPATIENT)
Dept: SURGERY | Facility: HOSPITAL | Age: 35
End: 2025-05-16
Payer: COMMERCIAL

## 2025-05-16 ENCOUNTER — OFFICE VISIT (OUTPATIENT)
Dept: SPORTS MEDICINE | Facility: CLINIC | Age: 35
End: 2025-05-16
Payer: COMMERCIAL

## 2025-05-16 VITALS — WEIGHT: 192.69 LBS | HEIGHT: 60 IN | BODY MASS INDEX: 37.83 KG/M2

## 2025-05-16 DIAGNOSIS — S83.511A RUPTURE OF ANTERIOR CRUCIATE LIGAMENT OF RIGHT KNEE, INITIAL ENCOUNTER: Primary | ICD-10-CM

## 2025-05-16 DIAGNOSIS — S83.261D PERIPHERAL TEAR OF LATERAL MENISCUS OF RIGHT KNEE AS CURRENT INJURY, SUBSEQUENT ENCOUNTER: ICD-10-CM

## 2025-05-16 PROCEDURE — 99999 PR PBB SHADOW E&M-EST. PATIENT-LVL III: CPT | Mod: PBBFAC,,, | Performed by: PHYSICIAN ASSISTANT

## 2025-05-16 RX ORDER — OXYCODONE AND ACETAMINOPHEN 10; 325 MG/1; MG/1
1 TABLET ORAL EVERY 6 HOURS PRN
Qty: 28 TABLET | Refills: 0 | Status: SHIPPED | OUTPATIENT
Start: 2025-05-16

## 2025-05-16 RX ORDER — SODIUM CHLORIDE 9 MG/ML
INJECTION, SOLUTION INTRAVENOUS CONTINUOUS
OUTPATIENT
Start: 2025-05-16

## 2025-05-16 RX ORDER — TRAMADOL HYDROCHLORIDE 50 MG/1
50 TABLET, FILM COATED ORAL EVERY 6 HOURS PRN
Qty: 20 TABLET | Refills: 0 | Status: SHIPPED | OUTPATIENT
Start: 2025-05-16

## 2025-05-16 RX ORDER — ASPIRIN 325 MG
325 TABLET ORAL DAILY
Qty: 21 TABLET | Refills: 0 | Status: SHIPPED | OUTPATIENT
Start: 2025-05-16 | End: 2025-06-06

## 2025-05-16 RX ORDER — PROMETHAZINE HYDROCHLORIDE 25 MG/1
25 TABLET ORAL EVERY 6 HOURS PRN
Qty: 20 TABLET | Refills: 0 | Status: SHIPPED | OUTPATIENT
Start: 2025-05-16

## 2025-05-16 NOTE — H&P (VIEW-ONLY)
Ron Devine  is here for a completion of her perioperative paperwork. she  Is scheduled to undergo:    right   1. ACL reconstruction with ipsilateral bone-patella tendon-bone autograft  2. Possible arthroscopic meniscus repair  3. Possible arthroscopic meniscectomy  4. Possible arthroscopic cartilage repair  5. Possible arthroscopic debridement  6. Possible arthroscopic microfracture      on 2025.      She is a healthy individual and does not need clearance for this procedure.     PAST MEDICAL HISTORY:   Past Medical History:   Diagnosis Date    Migraines      PAST SURGICAL HISTORY:   Past Surgical History:   Procedure Laterality Date     SECTION  2009    X 2    DILATION AND CURETTAGE OF UTERUS      EAB X 2    TUBAL LIGATION       FAMILY HISTORY:   Family History   Problem Relation Name Age of Onset    Breast cancer Neg Hx      Cancer Neg Hx      Colon cancer Neg Hx      Ovarian cancer Neg Hx       SOCIAL HISTORY: Social History[1]    MEDICATIONS: Current Medications[2]  ALLERGIES: Review of patient's allergies indicates:  No Known Allergies    VITAL SIGNS: Ht 5' (1.524 m)   Wt 87.4 kg (192 lb 10.9 oz)   BMI 37.63 kg/m²      Risks, indications and benefits of the surgical procedure were discussed with the patient. All questions with regard to surgery, rehab, expected return to functional activities, activities of daily living and recreational endeavors were answered to her satisfaction.    It was explained to the patient that there may be an increase in surgical risks if the patient has certain co-morbidities such as but not limited to: Obesity, Cardiovascular issues (CHF, CAD, Arrhythmias), chronic pulmonary issues, previous or current neurovascular/neurological issues, previous strokes, diabetes mellitus, previous wound healing issues, previous wound or skin infections, PVD, clotting disorders, if the patient uses chronic steroids, if the patient takes or has immune compromising  medications or diseases, or has previously or currently used tobacco products.     The patient verbalized that he/she does not have any additional clotting, bleeding, or blood disorders, other than what is list in her chart on today's review.     Then a brief history and physical exam were performed.    Review of Systems   Constitution: Negative. Negative for chills, fever and night sweats.   HENT: Negative for congestion and headaches.    Eyes: Negative for blurred vision, left vision loss and right vision loss.   Cardiovascular: Negative for chest pain and syncope.   Respiratory: Negative for cough and shortness of breath.    Endocrine: Negative for polydipsia, polyphagia and polyuria.   Hematologic/Lymphatic: Negative for bleeding problem. Does not bruise/bleed easily.   Skin: Negative for dry skin, itching and rash.   Musculoskeletal: Negative for falls and muscle weakness.   Gastrointestinal: Negative for abdominal pain and bowel incontinence.   Genitourinary: Negative for bladder incontinence and nocturia.   Neurological: Negative for disturbances in coordination, loss of balance and seizures.   Psychiatric/Behavioral: Negative for depression. The patient does not have insomnia.    Allergic/Immunologic: Negative for hives and persistent infections.     PHYSICAL EXAM:  GEN: A&Ox3, WD WN NAD  HEENT: WNL  CHEST: CTAB, no W/R/R  HEART: RRR, no M/R/G  ABD: Soft, NT ND, BS x4 QUADS  MS; See Epic  NEURO: CN II-XII intact       The surgical consent was then reviewed with the patient, who agreed with all the contents of the consent form and it was signed. she was then given the [facility] surgery packet to bring with her to surgery for the anesthesia portion of her perioperative paperwork.   For all physicians except for Dr. Blount, we will email and possibly fax the consent forms and booking sheets to [facility] pre-admit.    The patient was given the opportunity to ask questions about the  surgical plan and consent form, and once no other questions were asked, I proceeded with the pre-op appointment.    PHYSICAL THERAPY:  She was also instructed regarding physical therapy and will begin on  POD#1 . She was given a copy of the original prescription to schedule. Another copy of this prescription was also faxed to [facility] in [facility].    POST OP CARE:instructions were reviewed including care of the wound and dressing after surgery and when she can shower.     CRUTCHES OR WALKER: It was explained to the patient that if they are having a lower extremity surgery that they will require either a walker or crutches to ambulate safely with after surgery. It was explained that a cane or other assistive devices are not sufficient to safely ambulate with after surgery. I explained to the patient that I will place an order for them to receive either crutches or a walker after surgery to go home with. It was explained that if they have crutches or a walker at home already, that they are REQUIRED to bring them to the hospital on the day of surgery. It was explained that if they do not have them at the hospital on the day of surgery that they WILL be provided a new pair or crutches or a walker to go home with to ensure ambulation will be safe if the patient needs to stop somewhere on the way home.      PAIN MANAGEMENT: Ron Devine was also given their pain management regimen, which includes the TENS unit given to her by [facility] along with the education required for its use.    PAIN MEDICATION:  Percocet 10/325mg 1 po q 4-6 hours prn pain  Ultram 50 mg Take 1-2 p.o. q.6 hours p.r.n. breakthrough pain,   Phenergan 25 mg one p.o. q.6 hours p.r.n. nausea and vomiting.    Post op meds to be delivered bedside prior to discharge. Deliver to family if patient is in surgery at 5pm.    The patient was told that narcotic pain medications may make them drowsy and instructions were given to not sign legal  documents, drive or operate heavy machinery, cars, or equipment while under the influence of narcotic medications. The patient was told and understands that narcotic pain medications should only be used as needed to control pain and that other options of pain control include TENs unit and ice packs/unit.     Patient was instructed to purchase and take Colace to counter possible GI side effects of taking opiates.     DVT prophylaxis was discussed with the patient today including risk factors for developing DVTs and history of DVTs. The patient was asked if any specific recommendations were given from the doctor/s that did pre-operative surgical clearance. The patient was then given an education sheet about DVTs and PE with warning signs and symptoms of both and steps to take if they suspect either of these.    Patient was asked if they were taking or using OCP pills or devices. If they answered yes, then they were instructed to stop using OCPs at this pre-operative appointment until 2 months post-op to help prevent DVT development. They understand that there are other forms of birth control that do not involve hormones. They expressed understanding that ignoring/not following this instruction could result in a DVT which could turn into a deadly pulmonary embolism.     This along with the Modified Caprini risk assessment model for VTE in general surgical patients was used to determine the patient's DVT risk.     From: Shilo MK, Domingo DA, Sydney SM, et al. Prevention of VTE in nonorthopedic surgical patients: antithrombotic therapy and prevention of thrombosis, 9th ed: American College of Chest Physicians evidence-based clinical practical guidelines. Chest 2012; 141:e227S. Copyright © 2012. Reproduced with permission from the American College of Chest Physicians.    The below listed DVT prophylaxis regimen was discussed along with SCDs during surgery and bilateral MARZENA compression stockings to be used post-op. Length of  treatment has been determined to be 10-42 days post-op by the above noted Caprini assessment model. Early ambulation post-op was also discussed and emphasized with the patient.     Patient was instructed to buy and take:  Aspirin 325mg QD x 3 weeks for DVT prophylaxis starting on the evening after surgery.  Patient will also use bilateral TEDs on lower extremities, SCDs during surgery, and early ambulation post-op. If the patient was previously taking 81mg baby aspirin, they were told to not take it will using the above stated aspirin and to restart the 81mg aspirin after completion of the aspirin dose.      Patient was also told to buy over the counter Prilosec medication and take it once daily for GI protection as long as they are taking NSAIDs or Aspirin.     Published data in Peewee GK, et al. J Arthroplasty. Oct; 31(10):2237-40, 2016; showed that aspirin use as prophylaxis during revision total joint arthroplasty was more effective than warfarin in preventing symptomatic venous thromboembolic events and was associated with lower complications.  Patients in the study were also treated with intermittent pneumatic compression devices. Compression stockings would be our method of mechanical prophylaxis, which has been shown to be similar to pneumatic compression in the systematic review, Tomas RJ, et al. Humera Surg. Feb; 239(2): 162-171, 2004.     Results showed a significantly higher incidence of symptomatic venous thromboembolic events among patients in the warfarin group vs. the aspirin group (1.75% vs. 0.56%). Researchers also noted a bleeding event rate of 1.5% among patients who received warfarin compared with a rate of 0.4% among patients who received aspirin.    Patient denies history of seizures.     I explained to following and the patient expressed understanding:  The patient is currently aware of the COVID19 pandemic and that proceeding with their surgical procedure could potentially increase  "exposure to coronavirus in the community. The patient understands that there is the possibility of delayed or cancelled appts or PT visits in the future. They understand that infection with the coronavirus could complicate their surgery recovery. They are aware of the current policies and procedures of [facility] and the government regarding the pandemic and they were given the option of delaying my surgery. The patient elects to proceed with surgery at this time.     The patient was instructed to practice strict social distancing, hand washing/hygiene, respiratory hygiene, and cough etiquette from now until 6 weeks following surgery to reduce the risk of [place] coronavirus.    As there were no other questions to be asked, she was given my business card along with Blade Blount MD business card if she has any questions or concerns prior to surgery or in the postop period.          [1]   Social History  Socioeconomic History    Marital status: Single   Tobacco Use    Smoking status: Never   Substance and Sexual Activity    Alcohol use: Yes     Comment: OCCASIONAL    Drug use: Not Currently     Types: Marijuana     Comment: "every now and then"    Sexual activity: Yes     Partners: Male     Birth control/protection: None     Social Drivers of Health     Financial Resource Strain: Medium Risk (3/10/2025)    Overall Financial Resource Strain (CARDIA)     Difficulty of Paying Living Expenses: Somewhat hard   Food Insecurity: Food Insecurity Present (3/10/2025)    Hunger Vital Sign     Worried About Running Out of Food in the Last Year: Sometimes true     Ran Out of Food in the Last Year: Never true   Transportation Needs: No Transportation Needs (3/10/2025)    PRAPARE - Transportation     Lack of Transportation (Medical): No     Lack of Transportation (Non-Medical): No   Physical Activity: Sufficiently Active (3/10/2025)    Exercise Vital Sign     Days of Exercise per Week: 3 days     Minutes of Exercise per " Session: 70 min   Stress: No Stress Concern Present (3/10/2025)    [employer] - Occupational Stress Questionnaire     Feeling of Stress : Only a little   Housing Stability: Low Risk  (3/10/2025)    Housing Stability Vital Sign     Unable to Pay for Housing in the Last Year: No     Number of Times Moved in the Last Year: 0     Homeless in the Last Year: No   [2]   Current Outpatient Medications:     amoxicillin-clavulanate 875-125mg (AUGMENTIN) 875-125 mg per tablet, Take 1 tablet by mouth 2 (two) times daily. (Patient not taking: Reported on 3/11/2025), Disp: 14 tablet, Rfl: 0    aspirin 325 MG tablet, Take 1 tablet (325 mg total) by mouth once daily. for 21 days, Disp: 21 tablet, Rfl: 0    clotrimazole-betamethasone 1-0.05% (LOTRISONE) cream, Apply to affected area 2 times daily (Patient not taking: Reported on 3/11/2025), Disp: 45 g, Rfl: 3    meloxicam (MOBIC) 7.5 MG tablet, Take 1 tablet (7.5 mg total) by mouth once daily., Disp: 30 tablet, Rfl: 3    norethindrone (AYGESTIN) 5 mg Tab, Take 1 tablet (5 mg total) by mouth once daily. TAKE D 1-10 Q MONTH, Disp: 10 tablet, Rfl: 12    ondansetron (ZOFRAN-ODT) 4 MG TbDL, Take 1 tablet (4 mg total) by mouth every 8 (eight) hours as needed (Nausea/Vomiting). (Patient not taking: Reported on 3/11/2025), Disp: 12 tablet, Rfl: 0    oxyCODONE-acetaminophen (PERCOCET)  mg per tablet, Take 1 tablet by mouth every 6 (six) hours as needed for Pain., Disp: 28 tablet, Rfl: 0    PRENATAL #48-IRON CB&GLU-FA-B6 ORAL, Take by mouth. (Patient not taking: Reported on 3/11/2025), Disp: , Rfl:     promethazine (PHENERGAN) 25 MG tablet, Take 1 tablet (25 mg total) by mouth every 6 (six) hours as needed for Nausea., Disp: 20 tablet, Rfl: 0    traMADoL (ULTRAM) 50 mg tablet, Take 1 tablet (50 mg total) by mouth every 6 (six) hours as needed for Pain., Disp: 20 tablet, Rfl: 0

## 2025-05-16 NOTE — H&P
Ron Devine  is here for a completion of her perioperative paperwork. she  Is scheduled to undergo:    right   1. ACL reconstruction with ipsilateral bone-patella tendon-bone autograft  2. Possible arthroscopic meniscus repair  3. Possible arthroscopic meniscectomy  4. Possible arthroscopic cartilage repair  5. Possible arthroscopic debridement  6. Possible arthroscopic microfracture      on 2025.      She is a healthy individual and does not need clearance for this procedure.     PAST MEDICAL HISTORY:   Past Medical History:   Diagnosis Date    Migraines      PAST SURGICAL HISTORY:   Past Surgical History:   Procedure Laterality Date     SECTION  2009    X 2    DILATION AND CURETTAGE OF UTERUS      EAB X 2    TUBAL LIGATION       FAMILY HISTORY:   Family History   Problem Relation Name Age of Onset    Breast cancer Neg Hx      Cancer Neg Hx      Colon cancer Neg Hx      Ovarian cancer Neg Hx       SOCIAL HISTORY: Social History[1]    MEDICATIONS: Current Medications[2]  ALLERGIES: Review of patient's allergies indicates:  No Known Allergies    VITAL SIGNS: Ht 5' (1.524 m)   Wt 87.4 kg (192 lb 10.9 oz)   BMI 37.63 kg/m²      Risks, indications and benefits of the surgical procedure were discussed with the patient. All questions with regard to surgery, rehab, expected return to functional activities, activities of daily living and recreational endeavors were answered to her satisfaction.    It was explained to the patient that there may be an increase in surgical risks if the patient has certain co-morbidities such as but not limited to: Obesity, Cardiovascular issues (CHF, CAD, Arrhythmias), chronic pulmonary issues, previous or current neurovascular/neurological issues, previous strokes, diabetes mellitus, previous wound healing issues, previous wound or skin infections, PVD, clotting disorders, if the patient uses chronic steroids, if the patient takes or has immune compromising  medications or diseases, or has previously or currently used tobacco products.     The patient verbalized that he/she does not have any additional clotting, bleeding, or blood disorders, other than what is list in her chart on today's review.     Then a brief history and physical exam were performed.    Review of Systems   Constitution: Negative. Negative for chills, fever and night sweats.   HENT: Negative for congestion and headaches.    Eyes: Negative for blurred vision, left vision loss and right vision loss.   Cardiovascular: Negative for chest pain and syncope.   Respiratory: Negative for cough and shortness of breath.    Endocrine: Negative for polydipsia, polyphagia and polyuria.   Hematologic/Lymphatic: Negative for bleeding problem. Does not bruise/bleed easily.   Skin: Negative for dry skin, itching and rash.   Musculoskeletal: Negative for falls and muscle weakness.   Gastrointestinal: Negative for abdominal pain and bowel incontinence.   Genitourinary: Negative for bladder incontinence and nocturia.   Neurological: Negative for disturbances in coordination, loss of balance and seizures.   Psychiatric/Behavioral: Negative for depression. The patient does not have insomnia.    Allergic/Immunologic: Negative for hives and persistent infections.     PHYSICAL EXAM:  GEN: A&Ox3, WD WN NAD  HEENT: WNL  CHEST: CTAB, no W/R/R  HEART: RRR, no M/R/G  ABD: Soft, NT ND, BS x4 QUADS  MS; See Epic  NEURO: CN II-XII intact       The surgical consent was then reviewed with the patient, who agreed with all the contents of the consent form and it was signed. she was then given the [facility] surgery packet to bring with her to surgery for the anesthesia portion of her perioperative paperwork.   For all physicians except for Dr. Blount, we will email and possibly fax the consent forms and booking sheets to [facility] pre-admit.    The patient was given the opportunity to ask questions about the  surgical plan and consent form, and once no other questions were asked, I proceeded with the pre-op appointment.    PHYSICAL THERAPY:  She was also instructed regarding physical therapy and will begin on  POD#1 . She was given a copy of the original prescription to schedule. Another copy of this prescription was also faxed to [facility] in [facility].    POST OP CARE:instructions were reviewed including care of the wound and dressing after surgery and when she can shower.     CRUTCHES OR WALKER: It was explained to the patient that if they are having a lower extremity surgery that they will require either a walker or crutches to ambulate safely with after surgery. It was explained that a cane or other assistive devices are not sufficient to safely ambulate with after surgery. I explained to the patient that I will place an order for them to receive either crutches or a walker after surgery to go home with. It was explained that if they have crutches or a walker at home already, that they are REQUIRED to bring them to the hospital on the day of surgery. It was explained that if they do not have them at the hospital on the day of surgery that they WILL be provided a new pair or crutches or a walker to go home with to ensure ambulation will be safe if the patient needs to stop somewhere on the way home.      PAIN MANAGEMENT: Ron Devine was also given their pain management regimen, which includes the TENS unit given to her by [facility] along with the education required for its use.    PAIN MEDICATION:  Percocet 10/325mg 1 po q 4-6 hours prn pain  Ultram 50 mg Take 1-2 p.o. q.6 hours p.r.n. breakthrough pain,   Phenergan 25 mg one p.o. q.6 hours p.r.n. nausea and vomiting.    Post op meds to be delivered bedside prior to discharge. Deliver to family if patient is in surgery at 5pm.    The patient was told that narcotic pain medications may make them drowsy and instructions were given to not sign legal  documents, drive or operate heavy machinery, cars, or equipment while under the influence of narcotic medications. The patient was told and understands that narcotic pain medications should only be used as needed to control pain and that other options of pain control include TENs unit and ice packs/unit.     Patient was instructed to purchase and take Colace to counter possible GI side effects of taking opiates.     DVT prophylaxis was discussed with the patient today including risk factors for developing DVTs and history of DVTs. The patient was asked if any specific recommendations were given from the doctor/s that did pre-operative surgical clearance. The patient was then given an education sheet about DVTs and PE with warning signs and symptoms of both and steps to take if they suspect either of these.    Patient was asked if they were taking or using OCP pills or devices. If they answered yes, then they were instructed to stop using OCPs at this pre-operative appointment until 2 months post-op to help prevent DVT development. They understand that there are other forms of birth control that do not involve hormones. They expressed understanding that ignoring/not following this instruction could result in a DVT which could turn into a deadly pulmonary embolism.     This along with the Modified Caprini risk assessment model for VTE in general surgical patients was used to determine the patient's DVT risk.     From: Shilo MK, Domingo DA, Sydney SM, et al. Prevention of VTE in nonorthopedic surgical patients: antithrombotic therapy and prevention of thrombosis, 9th ed: American College of Chest Physicians evidence-based clinical practical guidelines. Chest 2012; 141:e227S. Copyright © 2012. Reproduced with permission from the American College of Chest Physicians.    The below listed DVT prophylaxis regimen was discussed along with SCDs during surgery and bilateral MARZENA compression stockings to be used post-op. Length of  treatment has been determined to be 10-42 days post-op by the above noted Caprini assessment model. Early ambulation post-op was also discussed and emphasized with the patient.     Patient was instructed to buy and take:  Aspirin 325mg QD x 3 weeks for DVT prophylaxis starting on the evening after surgery.  Patient will also use bilateral TEDs on lower extremities, SCDs during surgery, and early ambulation post-op. If the patient was previously taking 81mg baby aspirin, they were told to not take it will using the above stated aspirin and to restart the 81mg aspirin after completion of the aspirin dose.      Patient was also told to buy over the counter Prilosec medication and take it once daily for GI protection as long as they are taking NSAIDs or Aspirin.     Published data in Peewee GK, et al. J Arthroplasty. Oct; 31(10):2237-40, 2016; showed that aspirin use as prophylaxis during revision total joint arthroplasty was more effective than warfarin in preventing symptomatic venous thromboembolic events and was associated with lower complications.  Patients in the study were also treated with intermittent pneumatic compression devices. Compression stockings would be our method of mechanical prophylaxis, which has been shown to be similar to pneumatic compression in the systematic review, Tomas RJ, et al. Humera Surg. Feb; 239(2): 162-171, 2004.     Results showed a significantly higher incidence of symptomatic venous thromboembolic events among patients in the warfarin group vs. the aspirin group (1.75% vs. 0.56%). Researchers also noted a bleeding event rate of 1.5% among patients who received warfarin compared with a rate of 0.4% among patients who received aspirin.    Patient denies history of seizures.     I explained to following and the patient expressed understanding:  The patient is currently aware of the COVID19 pandemic and that proceeding with their surgical procedure could potentially increase  "exposure to coronavirus in the community. The patient understands that there is the possibility of delayed or cancelled appts or PT visits in the future. They understand that infection with the coronavirus could complicate their surgery recovery. They are aware of the current policies and procedures of [facility] and the government regarding the pandemic and they were given the option of delaying my surgery. The patient elects to proceed with surgery at this time.     The patient was instructed to practice strict social distancing, hand washing/hygiene, respiratory hygiene, and cough etiquette from now until 6 weeks following surgery to reduce the risk of kimberley coronavirus.    As there were no other questions to be asked, she was given my business card along with Blade Blount MD business card if she has any questions or concerns prior to surgery or in the postop period.          [1]   Social History  Socioeconomic History    Marital status: Single   Tobacco Use    Smoking status: Never   Substance and Sexual Activity    Alcohol use: Yes     Comment: OCCASIONAL    Drug use: Not Currently     Types: Marijuana     Comment: "every now and then"    Sexual activity: Yes     Partners: Male     Birth control/protection: None     Social Drivers of Health     Financial Resource Strain: Medium Risk (3/10/2025)    Overall Financial Resource Strain (CARDIA)     Difficulty of Paying Living Expenses: Somewhat hard   Food Insecurity: Food Insecurity Present (3/10/2025)    Hunger Vital Sign     Worried About Running Out of Food in the Last Year: Sometimes true     Ran Out of Food in the Last Year: Never true   Transportation Needs: No Transportation Needs (3/10/2025)    PRAPARE - Transportation     Lack of Transportation (Medical): No     Lack of Transportation (Non-Medical): No   Physical Activity: Sufficiently Active (3/10/2025)    Exercise Vital Sign     Days of Exercise per Week: 3 days     Minutes of Exercise per " Session: 70 min   Stress: No Stress Concern Present (3/10/2025)    [employer] - Occupational Stress Questionnaire     Feeling of Stress : Only a little   Housing Stability: Low Risk  (3/10/2025)    Housing Stability Vital Sign     Unable to Pay for Housing in the Last Year: No     Number of Times Moved in the Last Year: 0     Homeless in the Last Year: No   [2]   Current Outpatient Medications:     amoxicillin-clavulanate 875-125mg (AUGMENTIN) 875-125 mg per tablet, Take 1 tablet by mouth 2 (two) times daily. (Patient not taking: Reported on 3/11/2025), Disp: 14 tablet, Rfl: 0    aspirin 325 MG tablet, Take 1 tablet (325 mg total) by mouth once daily. for 21 days, Disp: 21 tablet, Rfl: 0    clotrimazole-betamethasone 1-0.05% (LOTRISONE) cream, Apply to affected area 2 times daily (Patient not taking: Reported on 3/11/2025), Disp: 45 g, Rfl: 3    meloxicam (MOBIC) 7.5 MG tablet, Take 1 tablet (7.5 mg total) by mouth once daily., Disp: 30 tablet, Rfl: 3    norethindrone (AYGESTIN) 5 mg Tab, Take 1 tablet (5 mg total) by mouth once daily. TAKE D 1-10 Q MONTH, Disp: 10 tablet, Rfl: 12    ondansetron (ZOFRAN-ODT) 4 MG TbDL, Take 1 tablet (4 mg total) by mouth every 8 (eight) hours as needed (Nausea/Vomiting). (Patient not taking: Reported on 3/11/2025), Disp: 12 tablet, Rfl: 0    oxyCODONE-acetaminophen (PERCOCET)  mg per tablet, Take 1 tablet by mouth every 6 (six) hours as needed for Pain., Disp: 28 tablet, Rfl: 0    PRENATAL #48-IRON [id number] ORAL, Take by mouth. (Patient not taking: Reported on 3/11/2025), Disp: , Rfl:     promethazine (PHENERGAN) 25 MG tablet, Take 1 tablet (25 mg total) by mouth every 6 (six) hours as needed for Nausea., Disp: 20 tablet, Rfl: 0    traMADoL (ULTRAM) 50 mg tablet, Take 1 tablet (50 mg total) by mouth every 6 (six) hours as needed for Pain., Disp: 20 tablet, Rfl: 0

## 2025-05-19 ENCOUNTER — HOSPITAL ENCOUNTER (OUTPATIENT)
Facility: HOSPITAL | Age: 35
Discharge: HOME | End: 2025-05-19
Attending: ORTHOPAEDIC SURGERY | Admitting: ORTHOPAEDIC SURGERY
Payer: COMMERCIAL

## 2025-05-19 ENCOUNTER — ANESTHESIA (OUTPATIENT)
Dept: SURGERY | Facility: HOSPITAL | Age: 35
End: 2025-05-19
Payer: COMMERCIAL

## 2025-05-19 VITALS
WEIGHT: 192 LBS | HEIGHT: 60 IN | TEMPERATURE: 98 F | SYSTOLIC BLOOD PRESSURE: 108 MMHG | OXYGEN SATURATION: 98 % | BODY MASS INDEX: 37.69 KG/M2 | HEART RATE: 89 BPM | DIASTOLIC BLOOD PRESSURE: 62 MMHG | RESPIRATION RATE: 20 BRPM

## 2025-05-19 DIAGNOSIS — S83.511A RUPTURE OF ANTERIOR CRUCIATE LIGAMENT OF RIGHT KNEE, INITIAL ENCOUNTER: ICD-10-CM

## 2025-05-19 DIAGNOSIS — S83.261D PERIPHERAL TEAR OF LATERAL MENISCUS OF RIGHT KNEE AS CURRENT INJURY, SUBSEQUENT ENCOUNTER: ICD-10-CM

## 2025-05-19 DIAGNOSIS — S83.511D RUPTURE OF ANTERIOR CRUCIATE LIGAMENT OF RIGHT KNEE, SUBSEQUENT ENCOUNTER: Primary | ICD-10-CM

## 2025-05-19 PROBLEM — S83.261A PERIPHERAL TEAR OF LATERAL MENISCUS OF RIGHT KNEE AS CURRENT INJURY: Status: ACTIVE | Noted: 2025-05-19

## 2025-05-19 LAB
B-HCG UR QL: NEGATIVE
CTP QC/QA: YES

## 2025-05-19 PROCEDURE — 94761 N-INVAS EAR/PLS OXIMETRY MLT: CPT

## 2025-05-19 PROCEDURE — 27201423 OPTIME MED/SURG SUP & DEVICES STERILE SUPPLY: Performed by: ORTHOPAEDIC SURGERY

## 2025-05-19 PROCEDURE — 99499 UNLISTED E&M SERVICE: CPT | Mod: ,,, | Performed by: STUDENT IN AN ORGANIZED HEALTH CARE EDUCATION/TRAINING PROGRAM

## 2025-05-19 PROCEDURE — 71000033 HC RECOVERY, INTIAL HOUR: Performed by: ORTHOPAEDIC SURGERY

## 2025-05-19 PROCEDURE — 36000711: Performed by: ORTHOPAEDIC SURGERY

## 2025-05-19 PROCEDURE — 29882 ARTHRS KNE SRG MNISC RPR M/L: CPT | Mod: 51,RT,, | Performed by: ORTHOPAEDIC SURGERY

## 2025-05-19 PROCEDURE — 81025 URINE PREGNANCY TEST: CPT | Performed by: ORTHOPAEDIC SURGERY

## 2025-05-19 PROCEDURE — 25000003 PHARM REV CODE 250: Performed by: PHYSICIAN ASSISTANT

## 2025-05-19 PROCEDURE — 63600175 PHARM REV CODE 636 W HCPCS: Performed by: STUDENT IN AN ORGANIZED HEALTH CARE EDUCATION/TRAINING PROGRAM

## 2025-05-19 PROCEDURE — 27427 RECONSTRUCTION KNEE: CPT | Mod: 51,RT,, | Performed by: ORTHOPAEDIC SURGERY

## 2025-05-19 PROCEDURE — 36000710: Performed by: ORTHOPAEDIC SURGERY

## 2025-05-19 PROCEDURE — C1762 CONN TISS, HUMAN(INC FASCIA): HCPCS | Performed by: ORTHOPAEDIC SURGERY

## 2025-05-19 PROCEDURE — 25000003 PHARM REV CODE 250: Performed by: STUDENT IN AN ORGANIZED HEALTH CARE EDUCATION/TRAINING PROGRAM

## 2025-05-19 PROCEDURE — 37000008 HC ANESTHESIA 1ST 15 MINUTES: Performed by: ORTHOPAEDIC SURGERY

## 2025-05-19 PROCEDURE — 63600175 PHARM REV CODE 636 W HCPCS: Performed by: NURSE ANESTHETIST, CERTIFIED REGISTERED

## 2025-05-19 PROCEDURE — 99900035 HC TECH TIME PER 15 MIN (STAT)

## 2025-05-19 PROCEDURE — 25000003 PHARM REV CODE 250: Performed by: NURSE ANESTHETIST, CERTIFIED REGISTERED

## 2025-05-19 PROCEDURE — 29888 ARTHRS AID ACL RPR/AGMNTJ: CPT | Mod: XS,RT,, | Performed by: ORTHOPAEDIC SURGERY

## 2025-05-19 PROCEDURE — 64448 NJX AA&/STRD FEM NRV NFS IMG: CPT | Performed by: STUDENT IN AN ORGANIZED HEALTH CARE EDUCATION/TRAINING PROGRAM

## 2025-05-19 PROCEDURE — 71000039 HC RECOVERY, EACH ADD'L HOUR: Performed by: ORTHOPAEDIC SURGERY

## 2025-05-19 PROCEDURE — 71000015 HC POSTOP RECOV 1ST HR: Performed by: ORTHOPAEDIC SURGERY

## 2025-05-19 PROCEDURE — D9220A PRA ANESTHESIA: Mod: CRNA,,, | Performed by: NURSE ANESTHETIST, CERTIFIED REGISTERED

## 2025-05-19 PROCEDURE — 37000009 HC ANESTHESIA EA ADD 15 MINS: Performed by: ORTHOPAEDIC SURGERY

## 2025-05-19 PROCEDURE — C1713 ANCHOR/SCREW BN/BN,TIS/BN: HCPCS | Performed by: ORTHOPAEDIC SURGERY

## 2025-05-19 PROCEDURE — 63600175 PHARM REV CODE 636 W HCPCS: Performed by: PHYSICIAN ASSISTANT

## 2025-05-19 PROCEDURE — 63600175 PHARM REV CODE 636 W HCPCS: Performed by: ORTHOPAEDIC SURGERY

## 2025-05-19 PROCEDURE — 25000003 PHARM REV CODE 250: Performed by: ANESTHESIOLOGY

## 2025-05-19 PROCEDURE — D9220A PRA ANESTHESIA: Mod: ANES,,, | Performed by: STUDENT IN AN ORGANIZED HEALTH CARE EDUCATION/TRAINING PROGRAM

## 2025-05-19 DEVICE — SCREW, CANN. INT., FULL THREAD
Type: IMPLANTABLE DEVICE | Site: KNEE | Status: FUNCTIONAL
Brand: ARTHREX®

## 2025-05-19 DEVICE — SCRW,CANN. INT.,W/DISP SHTH
Type: IMPLANTABLE DEVICE | Site: KNEE | Status: FUNCTIONAL
Brand: ARTHREX®

## 2025-05-19 DEVICE — FIBER CORTICAL ENHANCE 2.5CC: Type: IMPLANTABLE DEVICE | Site: KNEE | Status: FUNCTIONAL

## 2025-05-19 DEVICE — IMPLANTABLE DEVICE
Type: IMPLANTABLE DEVICE | Site: KNEE | Status: FUNCTIONAL
Brand: FIBERSTITCH™ IMPLANT 1.5, 24° CURVED WITH TWO POLYESTER IMPLANTS AND 2-0 FIBERWI

## 2025-05-19 DEVICE — IMPLSYS 2NDRY FIXATN BIOSWVLK 4.75X19.1
Type: IMPLANTABLE DEVICE | Site: KNEE | Status: FUNCTIONAL
Brand: ARTHREX®

## 2025-05-19 RX ORDER — FENTANYL CITRATE 50 UG/ML
100 INJECTION, SOLUTION INTRAMUSCULAR; INTRAVENOUS
Status: DISCONTINUED | OUTPATIENT
Start: 2025-05-19 | End: 2025-05-19 | Stop reason: HOSPADM

## 2025-05-19 RX ORDER — ONDANSETRON HYDROCHLORIDE 2 MG/ML
4 INJECTION, SOLUTION INTRAVENOUS EVERY 12 HOURS PRN
Status: DISCONTINUED | OUTPATIENT
Start: 2025-05-19 | End: 2025-05-19 | Stop reason: HOSPADM

## 2025-05-19 RX ORDER — GLUCAGON 1 MG
1 KIT INJECTION
Status: DISCONTINUED | OUTPATIENT
Start: 2025-05-19 | End: 2025-05-19 | Stop reason: HOSPADM

## 2025-05-19 RX ORDER — ROPIVACAINE HYDROCHLORIDE 5 MG/ML
INJECTION, SOLUTION EPIDURAL; INFILTRATION; PERINEURAL
Status: COMPLETED | OUTPATIENT
Start: 2025-05-19 | End: 2025-05-19

## 2025-05-19 RX ORDER — METOCLOPRAMIDE HYDROCHLORIDE 5 MG/ML
10 INJECTION INTRAMUSCULAR; INTRAVENOUS EVERY 10 MIN PRN
Status: DISCONTINUED | OUTPATIENT
Start: 2025-05-19 | End: 2025-05-19 | Stop reason: HOSPADM

## 2025-05-19 RX ORDER — MIDAZOLAM HYDROCHLORIDE 1 MG/ML
1 INJECTION, SOLUTION INTRAMUSCULAR; INTRAVENOUS
Status: DISCONTINUED | OUTPATIENT
Start: 2025-05-19 | End: 2025-05-19 | Stop reason: HOSPADM

## 2025-05-19 RX ORDER — DEXMEDETOMIDINE HYDROCHLORIDE 100 UG/ML
INJECTION, SOLUTION INTRAVENOUS
Status: DISCONTINUED | OUTPATIENT
Start: 2025-05-19 | End: 2025-05-19

## 2025-05-19 RX ORDER — SODIUM CHLORIDE 9 MG/ML
INJECTION, SOLUTION INTRAVENOUS CONTINUOUS
Status: DISCONTINUED | OUTPATIENT
Start: 2025-05-19 | End: 2025-05-19 | Stop reason: HOSPADM

## 2025-05-19 RX ORDER — OXYCODONE HYDROCHLORIDE 5 MG/1
10 TABLET ORAL EVERY 4 HOURS PRN
Status: DISCONTINUED | OUTPATIENT
Start: 2025-05-19 | End: 2025-05-19 | Stop reason: HOSPADM

## 2025-05-19 RX ORDER — VANCOMYCIN HYDROCHLORIDE 1 G/20ML
INJECTION, POWDER, LYOPHILIZED, FOR SOLUTION INTRAVENOUS
Status: DISCONTINUED | OUTPATIENT
Start: 2025-05-19 | End: 2025-05-19 | Stop reason: HOSPADM

## 2025-05-19 RX ORDER — TRAMADOL HYDROCHLORIDE 50 MG/1
100 TABLET, FILM COATED ORAL EVERY 6 HOURS PRN
Status: DISCONTINUED | OUTPATIENT
Start: 2025-05-19 | End: 2025-05-19 | Stop reason: HOSPADM

## 2025-05-19 RX ORDER — HYDROMORPHONE HYDROCHLORIDE 1 MG/ML
0.2 INJECTION, SOLUTION INTRAMUSCULAR; INTRAVENOUS; SUBCUTANEOUS EVERY 5 MIN PRN
Status: COMPLETED | OUTPATIENT
Start: 2025-05-19 | End: 2025-05-19

## 2025-05-19 RX ORDER — PROPOFOL 10 MG/ML
VIAL (ML) INTRAVENOUS
Status: DISCONTINUED | OUTPATIENT
Start: 2025-05-19 | End: 2025-05-19

## 2025-05-19 RX ORDER — NEOSTIGMINE METHYLSULFATE 0.5 MG/ML
INJECTION INTRAVENOUS
Status: DISCONTINUED | OUTPATIENT
Start: 2025-05-19 | End: 2025-05-19

## 2025-05-19 RX ORDER — CEFAZOLIN 2 G/1
2 INJECTION, POWDER, FOR SOLUTION INTRAMUSCULAR; INTRAVENOUS
Status: COMPLETED | OUTPATIENT
Start: 2025-05-19 | End: 2025-05-19

## 2025-05-19 RX ORDER — ONDANSETRON HYDROCHLORIDE 2 MG/ML
INJECTION, SOLUTION INTRAVENOUS
Status: DISCONTINUED | OUTPATIENT
Start: 2025-05-19 | End: 2025-05-19

## 2025-05-19 RX ORDER — HALOPERIDOL LACTATE 5 MG/ML
0.5 INJECTION, SOLUTION INTRAMUSCULAR EVERY 10 MIN PRN
Status: DISCONTINUED | OUTPATIENT
Start: 2025-05-19 | End: 2025-05-19 | Stop reason: HOSPADM

## 2025-05-19 RX ORDER — OXYCODONE HYDROCHLORIDE 5 MG/1
5 TABLET ORAL ONCE
Refills: 0 | Status: COMPLETED | OUTPATIENT
Start: 2025-05-19 | End: 2025-05-19

## 2025-05-19 RX ORDER — PROMETHAZINE HYDROCHLORIDE 25 MG/1
25 TABLET ORAL EVERY 6 HOURS PRN
Status: DISCONTINUED | OUTPATIENT
Start: 2025-05-19 | End: 2025-05-19 | Stop reason: HOSPADM

## 2025-05-19 RX ORDER — LIDOCAINE HYDROCHLORIDE 20 MG/ML
INJECTION INTRAVENOUS
Status: DISCONTINUED | OUTPATIENT
Start: 2025-05-19 | End: 2025-05-19

## 2025-05-19 RX ORDER — DEXAMETHASONE SODIUM PHOSPHATE 4 MG/ML
INJECTION, SOLUTION INTRA-ARTICULAR; INTRALESIONAL; INTRAMUSCULAR; INTRAVENOUS; SOFT TISSUE
Status: DISCONTINUED | OUTPATIENT
Start: 2025-05-19 | End: 2025-05-19

## 2025-05-19 RX ORDER — KETAMINE HCL IN 0.9 % NACL 50 MG/5 ML
SYRINGE (ML) INTRAVENOUS
Status: DISCONTINUED | OUTPATIENT
Start: 2025-05-19 | End: 2025-05-19

## 2025-05-19 RX ORDER — CELECOXIB 200 MG/1
400 CAPSULE ORAL
Status: COMPLETED | OUTPATIENT
Start: 2025-05-19 | End: 2025-05-19

## 2025-05-19 RX ORDER — ACETAMINOPHEN 500 MG
1000 TABLET ORAL
Status: COMPLETED | OUTPATIENT
Start: 2025-05-19 | End: 2025-05-19

## 2025-05-19 RX ORDER — METHOCARBAMOL 500 MG/1
1000 TABLET, FILM COATED ORAL ONCE
Status: COMPLETED | OUTPATIENT
Start: 2025-05-19 | End: 2025-05-19

## 2025-05-19 RX ORDER — OXYCODONE HYDROCHLORIDE 5 MG/1
5 TABLET ORAL
Status: DISCONTINUED | OUTPATIENT
Start: 2025-05-19 | End: 2025-05-19 | Stop reason: HOSPADM

## 2025-05-19 RX ORDER — ROCURONIUM BROMIDE 10 MG/ML
INJECTION, SOLUTION INTRAVENOUS
Status: DISCONTINUED | OUTPATIENT
Start: 2025-05-19 | End: 2025-05-19

## 2025-05-19 RX ORDER — ROPIVACAINE HYDROCHLORIDE 2 MG/ML
INJECTION, SOLUTION EPIDURAL; INFILTRATION; PERINEURAL CONTINUOUS
Status: DISCONTINUED | OUTPATIENT
Start: 2025-05-19 | End: 2025-05-19 | Stop reason: HOSPADM

## 2025-05-19 RX ORDER — MORPHINE SULFATE 2 MG/ML
2 INJECTION, SOLUTION INTRAMUSCULAR; INTRAVENOUS EVERY 10 MIN PRN
Status: DISCONTINUED | OUTPATIENT
Start: 2025-05-19 | End: 2025-05-19 | Stop reason: HOSPADM

## 2025-05-19 RX ORDER — EPINEPHRINE 1 MG/ML
INJECTION, SOLUTION, CONCENTRATE INTRAVENOUS
Status: DISCONTINUED | OUTPATIENT
Start: 2025-05-19 | End: 2025-05-19 | Stop reason: HOSPADM

## 2025-05-19 RX ORDER — IBUPROFEN 200 MG
200 TABLET ORAL EVERY 6 HOURS PRN
COMMUNITY

## 2025-05-19 RX ADMIN — ROPIVACAINE HYDROCHLORIDE 7.5 ML: 5 INJECTION EPIDURAL; INFILTRATION; PERINEURAL at 06:05

## 2025-05-19 RX ADMIN — OXYCODONE 5 MG: 5 TABLET ORAL at 11:05

## 2025-05-19 RX ADMIN — ACETAMINOPHEN 1000 MG: 500 TABLET ORAL at 05:05

## 2025-05-19 RX ADMIN — HYDROMORPHONE HYDROCHLORIDE 0.2 MG: 1 INJECTION, SOLUTION INTRAMUSCULAR; INTRAVENOUS; SUBCUTANEOUS at 11:05

## 2025-05-19 RX ADMIN — LIDOCAINE HYDROCHLORIDE 100 MG: 20 INJECTION INTRAVENOUS at 07:05

## 2025-05-19 RX ADMIN — SODIUM CHLORIDE: 9 INJECTION, SOLUTION INTRAVENOUS at 07:05

## 2025-05-19 RX ADMIN — METHOCARBAMOL 1000 MG: 500 TABLET ORAL at 11:05

## 2025-05-19 RX ADMIN — MIDAZOLAM 2 MG: 1 INJECTION INTRAMUSCULAR; INTRAVENOUS at 06:05

## 2025-05-19 RX ADMIN — FENTANYL CITRATE 100 MCG: 50 INJECTION INTRAMUSCULAR; INTRAVENOUS at 06:05

## 2025-05-19 RX ADMIN — CEFAZOLIN 2 G: 2 INJECTION, POWDER, FOR SOLUTION INTRAMUSCULAR; INTRAVENOUS at 07:05

## 2025-05-19 RX ADMIN — DEXAMETHASONE SODIUM PHOSPHATE 8 MG: 4 INJECTION, SOLUTION INTRAMUSCULAR; INTRAVENOUS at 07:05

## 2025-05-19 RX ADMIN — PROPOFOL 180 MG: 10 INJECTION, EMULSION INTRAVENOUS at 07:05

## 2025-05-19 RX ADMIN — Medication 15 MG: at 07:05

## 2025-05-19 RX ADMIN — DEXMEDETOMIDINE 10 MCG: 100 INJECTION, SOLUTION, CONCENTRATE INTRAVENOUS at 07:05

## 2025-05-19 RX ADMIN — Medication: at 10:05

## 2025-05-19 RX ADMIN — ONDANSETRON 4 MG: 2 INJECTION INTRAMUSCULAR; INTRAVENOUS at 07:05

## 2025-05-19 RX ADMIN — GLYCOPYRROLATE 0.3 MG: 0.2 INJECTION, SOLUTION INTRAMUSCULAR; INTRAVENOUS at 10:05

## 2025-05-19 RX ADMIN — SODIUM CHLORIDE: 0.9 INJECTION, SOLUTION INTRAVENOUS at 05:05

## 2025-05-19 RX ADMIN — HYDROMORPHONE HYDROCHLORIDE 0.2 MG: 1 INJECTION, SOLUTION INTRAMUSCULAR; INTRAVENOUS; SUBCUTANEOUS at 10:05

## 2025-05-19 RX ADMIN — CELECOXIB 400 MG: 200 CAPSULE ORAL at 05:05

## 2025-05-19 RX ADMIN — NEOSTIGMINE METHYLSULFATE 3 MG: 0.5 INJECTION INTRAVENOUS at 10:05

## 2025-05-19 RX ADMIN — SODIUM CHLORIDE: 9 INJECTION, SOLUTION INTRAVENOUS at 06:05

## 2025-05-19 RX ADMIN — OXYCODONE 5 MG: 5 TABLET ORAL at 10:05

## 2025-05-19 RX ADMIN — ROCURONIUM BROMIDE 50 MG: 10 INJECTION, SOLUTION INTRAVENOUS at 07:05

## 2025-05-19 NOTE — BRIEF OP NOTE
[place] - Surgery ([facility])  Brief Operative Note    Surgery Date: 5/19/2025     Surgeons and Role:     * Blade Blount MD - Primary    Assisting Surgeon: None    Pre-op Diagnosis:  Rupture of anterior cruciate ligament of right knee, initial encounter [S83.511A]  Peripheral tear of lateral meniscus of right knee as current injury, subsequent encounter [S83.261D]    Post-op Diagnosis:  Post-Op Diagnosis Codes:     * Rupture of anterior cruciate ligament of right knee, initial encounter [S83.511A]     * Peripheral tear of lateral meniscus of right knee as current injury, subsequent encounter [S83.261D]    Procedure(s) (LRB):  RECONSTRUCTION, KNEE, ACL, ARTHROSCOPIC WITH BTB AUTOGRAPH (Right)  ARTHROSCOPY,KNEE,WITH  LATERAL MENISCUS REPAIR (Right)  RECONSTRUCTION, LIGAMENT, KNEE, EXTRA-ARTICULAR (Right)  ARTHROSCOPY, KNEE, WITH CHONDROPLASTY (Right)    Anesthesia: General    Operative Findings: ACL tear, lateral meniscus tear    Estimated Blood Loss: * No values recorded between 5/19/2025  7:04 AM and 5/19/2025 10:24 AM *         Specimens:   Specimen (24h ago, onward)      None            * No specimens in log *        Discharge Note    OUTCOME: Patient tolerated treatment/procedure well without complication and is now ready for discharge.    DISPOSITION: Home or Self Care    FINAL DIAGNOSIS:  ACL tear, lateral meniscus tear    FOLLOWUP: In clinic    DISCHARGE INSTRUCTIONS:    Discharge Procedure Orders   Diet general     Call MD for:  temperature >100.4     Call MD for:  persistent nausea and vomiting     Call MD for:  severe uncontrolled pain     Call MD for:  difficulty breathing, headache or visual disturbances     Call MD for:  redness, tenderness, or signs of infection (pain, swelling, redness, odor or green/yellow discharge around incision site)     Call MD for:  hives     Call MD for:  persistent dizziness or light-headedness

## 2025-05-19 NOTE — OPERATIVE NOTE ADDENDUM
Certification of Assistant at Surgery       Surgery Date: 5/19/2025     Participating Surgeons:  Surgeons and Role:     * Blade Blount MD - Primary    Procedures:  Procedure(s) (LRB):  RECONSTRUCTION, KNEE, ACL, ARTHROSCOPIC WITH BTB AUTOGRAPH (Right)  ARTHROSCOPY,KNEE,WITH  LATERAL MENISCUS REPAIR (Right)  RECONSTRUCTION, LIGAMENT, KNEE, EXTRA-ARTICULAR (Right)  ARTHROSCOPY, KNEE, WITH CHONDROPLASTY (Right)    Assistant Surgeon's Certification of Necessity:  I understand that section 1842 (b) (6) (d) of the Social Security Act generally prohibits Medicare Part B reasonable charge payment for the services of assistants at surgery in teaching hospitals when qualified residents are available to furnish such services. I certify that the services for which payment is claimed were medically necessary, and that no qualified resident was available to perform the services. I further understand that these services are subject to post-payment review by the Medicare carrier.      Ino Castillo MD    05/19/2025  10:24 AM

## 2025-05-19 NOTE — DISCHARGE INSTRUCTIONS
[address], [place]                                                                                          [phone]                   Postoperative Instructions for Knee Surgery                 Your Surgery Included:   Open               Arthroscopic   [] Ligament Repair       [] Diagnostic           [] ACL     [] PCL     [] MCL     [] PLLC      [] Synovectomy / Plica Removal [] Meniscal Cartilage Repair / Transplantation      [] Lysis of Adhesions / Manipulation [] Articular Cartilage Repair      [] Interval Release           [] Cartimax       [] OATS   [] RONAL      [] Meniscectomy           [] Osteochondral Allograft      [x] Meniscal Repair  [] Patellar Realignment       [x] Debridement / Chondroplasty         [] Lateral Release   [] Ligament Repair      [] Articular Cartilage Repair          [] Extensor Mechanism             []   Microfracture  []  OATS         []  Cartilage Biopsy [] Tendon Repair          [x] Ligament Reconstruction          [] Patella                  [] Quadriceps             [x]   ACL    []   PCL  [] High Tibial Osteotomy       [] Subchondroplasty  [] Joint Replacement         [] Amniox Arthrocentesis           [] Unicompartmental   [] Patellofemoral                  Call our office ([phone]) immediately or message through [facility] if you experience any of the following:       Excessive bleeding or pus like drainage at the incision site       Uncontrollable pain not relieved by pain medication       Excessive swelling or redness at the incision site       Fever above 101.5 degrees not controlled with Tylenol or Motrin       Shortness of Breath or severe calf pain       Any foul odor or blistering from the surgery site    FOR EMERGENCIES: [facility] is the best way to contact us. If on the weekend, page the  at [phone] who will direct your call appropriately.    1.   Pain Management: A cold therapy cuff, pain medications, local  injections, TENs unit, and in some cases, regional anesthesia injections are used to manage your post-operative pain. The decision to use each of these options is based on their risks and benefits.     Medications: You were given one or more of the following medication prescriptions during your preoperative appointment. Follow the instructions on the bottles.     Narcotic Medication (usually Percocet, Norco, or Tramadol): Begin taking the medication before your knee starts to hurt. Some patients do not like to take any medication, but if you wait until your pain is severe before taking, you will be very uncomfortable for several hours waiting for the narcotic to work. Always take with food.     Nausea / Vomiting: For this issue, we prescribe Phenergan or Zofran, use this medication as directed as needed for nausea.     Cold Therapy: You may have been sent home with a blabfeed Care® cold therapy unit and wrap for your knee. Fill with ice and water to the indicated fill line. You can use 20-30 minutes on then off, several times a day. This will help relieve pain and control swelling. Do not sleep with on.     Regional Anesthesia Injections (Blocks): You may have been given a regional nerve block/catheter either before or after surgery. This may make your entire leg numb for 2-5 days.                           * Proceed with caution when bearing weight on your leg.     2.   Diet: Eat a bland diet for the first day after surgery. Progress your diet as tolerated. Constipation may occur with Narcotic usage. We recommend Colace 100mg twice a day while taking narcotics.    3.   Activity: Limit your activity during the first 48 hours, keep your leg elevated with pillows under your heel. After the first 48 hours at home, increase your activity level based on your symptoms.    4.   Dressing: (b) The soft, bulky dressing will be removed on the 3rd day after surgery. The Aquacel (tan, long adhesive bandage) will remain on until the  1st post operative appointment. Place waterproof bandages at this time. Keep wounds as dry as possible for first 2 weeks. It is normal for some blood to be seen on the dressings. It is also normal for you to see apparent bruising on the skin around your incisions. If you are concerned by the drainage or the appearance of your wound site, you can send a picture via [facility].    5.   Shower: (b) You may shower on the 3rd day after surgery. The Aquacel bandage is to be covered with saran wrap before showering. Do not get bandage wet. You may see a small incision with a suture. Place waterproof bandages  prior to shower. It is recommended to use Saran wrap before showering. Do not submerge limb in any water for 4 weeks or incisions completely healed.    6.   Knee Brace: You may have been sent home in a hinged knee brace. Your brace is set at 0 to 90 degrees of motion. Wear the brace for 6 weeks, LOCKED in full extension when walking, you will need to wear this brace at all time unless instructed otherwise. You may unlock at rest or for exercises.    7.   Your procedure did not require a Continuous Passive Motion (CPM) device.    8.   Weight Bearing: You may have been sent home with crutches. If instructed (see below), use these crutches at all times unless at complete rest.      [x] Non-weight bearing for     2 weeks (you may touch your toes to the floor)      [] Partial weight bearing for  {NUMBER:08126} weeks    [] 25% Body Weight   [] 50% Body Weight      [] Full weight bearing            []  NOW    []  after {NUMBER:42614} weeks     9.  Knee Exercises: Begin these exercises the first day after surgery in order to help you regain your motion and strength. You may do the following marked exercises:     [x] Quad Sets - Begin activating your quadriceps muscle by driving your          knee downward into full knee extension while seated on a table or bed   with a towel rolled and propped under your heel     [x] Straight  "Leg Raise (SLR) - While kimberley your quadriceps muscle, lift     your fully extended leg to the level of your non-operative knee (as shown)     [x] Heel Slides - With the knee straight, slide your heel slowly toward your   buttocks, hold at the endpoint for 10-15 seconds, then slowly straighten     [x] Ankle pumps - With your knee straight, move your ankle in a "pumping"    fashion to activate your calf and leg muscles      10.  Physical Therapy: Physical therapy is an essential component to your recovery from surgery. Your physical therapy will start in 3 days.    FIRST POSTOPERATIVE VISIT: As scheduled.       "

## 2025-05-19 NOTE — TRANSFER OF CARE
Anesthesia Transfer of Care Note    Patient: Ron Devine    Procedure(s) Performed: Procedure(s) (LRB):  RECONSTRUCTION, KNEE, ACL, ARTHROSCOPIC WITH BTB AUTOGRAPH (Right)  ARTHROSCOPY,KNEE,WITH  LATERAL MENISCUS REPAIR (Right)  RECONSTRUCTION, LIGAMENT, KNEE, EXTRA-ARTICULAR (Right)  ARTHROSCOPY, KNEE, WITH CHONDROPLASTY (Right)    Patient location: PACU    Anesthesia Type: general    Transport from OR: Transported from OR on 6-10 L/min O2 by face mask with adequate spontaneous ventilation    Post pain: adequate analgesia    Post assessment: no apparent anesthetic complications and tolerated procedure well    Post vital signs: stable    Level of consciousness: awake and alert    Nausea/Vomiting: no nausea/vomiting    Complications: none    Transfer of care protocol was followed      Last vitals: Visit Vitals  /73 (BP Location: Right arm, Patient Position: Lying)   Pulse 80   Temp 36.7 °C (98.1 °F) (Oral)   Resp 13   Ht 5' (1.524 m)   Wt 87.1 kg (192 lb)   LMP 04/25/2025 (Exact Date)   SpO2 99%   Breastfeeding No   BMI 37.50 kg/m²

## 2025-05-19 NOTE — ANESTHESIA PREPROCEDURE EVALUATION
2025  Ron Devine is a 34 y.o., female for R knee arthroscopy and ACL repair      Pre-op Assessment    I have reviewed the Patient Summary Reports.    I have reviewed the NPO Status.   I have reviewed the Medications.     Review of Systems  Anesthesia Hx:               Denies Personal Hx of Anesthesia complications.                    Social:  Alcohol Use, Non-Smoker       Hematology/Oncology:  Hematology Normal   Oncology Normal                                   EENT/Dental:  EENT/Dental Normal           Cardiovascular:  Exercise tolerance: good          Denies Angina.                                    Pulmonary:       Denies Shortness of breath.                  Renal/:  Renal/ Normal                 Hepatic/GI:  Hepatic/GI Normal                    Musculoskeletal:       Rupture of anterior cruciate ligament of right knee, initial encounter    Peripheral tear of lateral meniscus of right knee as current injury, subsequent encounter              OB/GYN/PEDS:  H/o dysfunctional uterine bleeding and breast lump   x2, D&C x2, Tubal ligation           Neurological:      Headaches (migraines)     H/o Bell's Palsy                            Endocrine:        Obesity / BMI > 30 (BMI-38.1)  Dermatological:  Skin Normal    Psych:  Psychiatric Normal                    Physical Exam  General: Alert, Cooperative and Oriented    Airway:  Mallampati: III / II  Mouth Opening: Normal  TM Distance: Normal  Tongue: Normal  Neck ROM: Normal ROM    Dental:  Intact    Chest/Lungs:  Normal Respiratory Rate    Heart:  Rate: Normal  Rhythm: Regular Rhythm      Past Medical History:   Diagnosis Date    Migraines      Past Surgical History:   Procedure Laterality Date     SECTION  2009    X 2    DILATION AND CURETTAGE OF UTERUS      EAB X 2    TUBAL LIGATION           Anesthesia Assessment:  Preoperative EQUATION    Planned Procedure: Procedure(s) (LRB):  RECONSTRUCTION, KNEE, ACL, ARTHROSCOPIC (Right)  ARTHROSCOPY,KNEE,WITH MENISCUS REPAIR (Right)  ARTHROSCOPY, KNEE, WITH MENISCECTOMY (Right)  Requested Anesthesia Type:General  Surgeon: Blade Blount MD  Service: Orthopedics  Known or anticipated Date of Surgery:5/19/2025    Surgeon notes: reviewed    Electronic QUestionnaire Assessment completed via nurse interview with patient.        Triage considerations:     The patient has no apparent active cardiac condition (No unstable coronary Syndrome such as severe unstable angina or recent [<1 month] myocardial infarction, decompensated CHF, severe valvular   disease or significant arrhythmia)    Previous anesthesia records:Not available    Last PCP note: > 1 year ago   Subspecialty notes: Ortho    Other important co-morbidities: Obesity      Tests already available:  No recent tests.             Instructions given. (See in Nurse's note)    Optimization:  Anesthesia Preop Clinic Assessment not Indicated    Medical Opinion not Indicated       Sub-specialist consult indicated:  no       Plan:     Consultation:clearance not requested    Patient  has previously scheduled Medical Appointment:    Navigation: Okay to proceed at [facility]              Straight Line to surgery.               No tests, anesthesia preop clinic visit, or consult required.    Ht:5'  Wt:195lbs  BMI:38.1      Anesthesia Plan  Type of Anesthesia, risks & benefits discussed:    Anesthesia Type: Gen ETT, Regional  Intra-op Monitoring Plan: Standard ASA Monitors  Post Op Pain Control Plan: multimodal analgesia, IV/PO Opioids PRN and peripheral nerve block  Induction:  IV  Airway Plan: Video, Post-Induction  Informed Consent: Informed consent signed with the Patient and all parties understand the risks and agree with anesthesia plan.  All questions answered.   ASA Score: 2    Ready For Surgery From Anesthesia Perspective.     .

## 2025-05-19 NOTE — ANESTHESIA PROCEDURE NOTES
Peripheral Block    Patient location during procedure: pre-op   Block not for primary anesthetic.  Reason for block: at surgeon's request and post-op pain management   Post-op Pain Location: right knee   Start time: 5/19/2025 6:50 AM  Timeout: 5/19/2025 6:49 AM   End time: 5/19/2025 7:00 AM    Staffing  Authorizing Provider: Matt Marcum MD  Performing Provider: Matt Marcum MD    Staffing  Performed by: Matt Marcum MD  Authorized by: Matt Marcum MD    Preanesthetic Checklist  Completed: patient identified, IV checked, site marked, risks and benefits discussed, surgical consent, monitors and equipment checked, pre-op evaluation and timeout performed  Peripheral Block  Patient position: supine  Prep: ChloraPrep and site prepped and draped  Patient monitoring: heart rate, cardiac monitor, continuous pulse ox, continuous capnometry and frequent blood pressure checks  Block type: adductor canal  Laterality: right  Injection technique: continuous  Needle  Needle type: Tuohy   Needle gauge: 17 G  Needle length: 3.5 in  Needle localization: anatomical landmarks and ultrasound guidance  Needle insertion depth: 7 cm  Catheter type: spring wound  Catheter size: 19 G  Catheter at skin depth: 13 cm  Test dose: lidocaine 1.5% with Epi 1-to-200,000 and negative   -ultrasound image captured on disc.  Assessment  Injection assessment: negative aspiration, negative parasthesia and local visualized surrounding nerve  Paresthesia pain: none  Heart rate change: no  Slow fractionated injection: yes  Pain Tolerance: comfortable throughout block and no complaints  Medications:    Medications: ropivacaine (NAROPIN) injection 0.5% - Perineural   7.5 mL - 5/19/2025 6:47:00 AM    Additional Notes  VSS.  [facility] RN monitoring vitals throughout procedure.  Patient tolerated procedure well.

## 2025-05-19 NOTE — PLAN OF CARE
Care plan reviewed w/ pt and friend at bedside. AVSS on RA. R knee dressing CDI, brace in place. Pt has crutches. CADD pump in place. Pain controlled w/ PRN medications. All Rx delivered to bedside. Pt states she is ready for discharge.

## 2025-05-19 NOTE — OP NOTE
[facility])  Surgery Department  Operative Note    SUMMARY     Date of Procedure: 5/19/2025     Procedure: Procedure(s) (LRB):  RECONSTRUCTION, KNEE, ACL, ARTHROSCOPIC WITH BTB AUTOGRAPH (Right)  ARTHROSCOPY,KNEE,WITH  LATERAL MENISCUS REPAIR (Right)  RECONSTRUCTION, LIGAMENT, KNEE, EXTRA-ARTICULAR (Right)  ARTHROSCOPY, KNEE, WITH CHONDROPLASTY (Right)     Surgeons and Role:     * Blade Blount MD - Primary    First Assistant:  Ino Castillo DO     ** The first assistant was used to prepare the graft and assist in tunnel placement. The case could not have been performed without the use of a skilled first assistant.       Assistants:  Cody Spence     Pre-Operative Diagnosis: Rupture of anterior cruciate ligament of right knee, initial encounter [S83.511A]  Peripheral tear of lateral meniscus of right knee as current injury, subsequent encounter [S83.261D]    Post-Operative Diagnosis: Post-Op Diagnosis Codes:     * Rupture of anterior cruciate ligament of right knee, initial encounter [S83.511A]     * Peripheral tear of lateral meniscus of right knee as current injury, subsequent encounter [S83.261D]    Anesthesia: General    Technical Procedures Used:     DATE OF PROCEDURE: 5/19/2025      PREOPERATIVE DIAGNOSES:   1. Right knee anterior cruciate ligament tear.   2. Right knee instability  3. Right knee lateral meniscus tear  4. Right knee chondromalacia    POSTOPERATIVE DIAGNOSES:   1. Right knee anterior cruciate ligament tear.   2. Right knee instability  3. Right knee lateral meniscus tear  4. Right knee chondromalacia    PROCEDURES:   1. Right knee anterior cruciate ligament reconstruction with ipsilateral bone-patellar tendon-bone autograft.   2. Right knee open lateral extra-articular tenodesis modified Alcides reconstruction (CPT 08737)  3. Right knee lateral meniscus repair  4. Right knee arthroscopic chondroplasty    SURGEON: Blade Blount M.D.     First  Assistant:  Ino Castillo DO     ** The first assistant was used to prepare the graft and assist in tunnel placement. The case could not have been performed without the use of a skilled first assistant.      Assistants:  Cody Spence     COMPLICATIONS: None.     POSITION: Supine with arthroscopic leg casey.     ACL GRAFT: Autograft BTB:   After preparation measurements:   Tendon graft width: 10 mm   Total graft length 75 mm   Tendon length 35 mm   Bone block width: 10 mm   Tibial bone block 20 mm   Femoral bone block 20 mm     TUNNELS: Anatomic   Femoral: AM portal drilled, 25 mm drilled, 20 mm used for graft   Tibial: 55 degree Arthrex guide for drilling   Distance from TT to tibial guide pin: 16 mm from edge of harvest; 24 mm from center of TT to pin     GRAFT: Flush so no graft-tunnel mismatch.     FIXATION USED: Arthrex 8 x 20 mm metal interference screw femur, and 9 x 20 mm metal interference screw tibia.     EXAMINATION UNDER ANESTHESIA: Left knee, full range of motion, grade II B positive Lachman, 2+ pivot shift. No opening to varus and valgus stress.     ARTHROSCOPIC FINDINGS:   1. Complete tearing, anterior cruciate ligament, with femoral avulsion.   2. Intact medial meniscus   3. Posterior horn lateral meniscus tear   4. Chondromalacia medial trochlea, grade 1/2     INDICATIONS FOR PROCEDURE: The patient is a 34-year-old female who sustained a twisting injury to her right knee. MRI confirms a complete tear of his anterior cruciate ligament and xrays show a 15-deg posterior slope.  After exam under anesthesia confirmed a significant pivot shift and her high posterior tibial slope along with her desire to return to high level activities, the decision was made to augment / protect her ACL reconstruction with a lateral extraarticular tenodesis. After risks and benefits were discussed, he regained his motion and elected to proceed with operative intervention, including ACL reconstruction with  ipsilateral bone-patellar tendon-bone autograft. All risks and benefits have been discussed.     PROCEDURE IN DETAIL: The patient brought in the room. After undergoing general endotracheal anesthesia, he was placed on a well-padded operating table. his right lower extremity was prepped and draped in usual sterile fashion. he was placed in a well-padded arthroscopic leg casey with a nonsterile tourniquet high up around the thigh. The contralateral leg was placed in well-padded Nilson stirrup. After Esmarch exsanguinated the limb, the tourniquet was elevated to 300 mmHg. Total tourniquet time for the case was approximately 30 min.     Attention was turned to the graft harvest first. An incision was made on the medial border of the patellar tendon from the inferior pole of the patella down to the tibial tubercle. After going through skin and subcutaneous tissues, full-thickness skin flaps were developed. The keny-tenon layer was identified and split in line with the skin incision. This was developed as a layer for later repair. A wide patellar tendon was visualized, greater than 30 mm in width.     The central third was planned for harvest. The central third, 10 mm was harvested with a double blade knife. A 10 x 20 mm bone blocks were taken in a rectangular shape from the tibial tubercle and more of a trapezoidal shape 10 x 20 mm from the patella. These were harvested without difficulty, and the graft was taken to the back table and prepared by the operative surgeon.     The graft was prepared by crimping down the bone blocks, removing any excess bone for later bone grafting the patella, and drilling 2 mm holes, and passing sutures into the graft.     CHONDROPLASTY: Attention was turned to the arthroscopic portion of case. Standard inferolateral and accessory medial portal were developed. The patellofemoral joint was entered. There was no patellar but mild trochlear chondromalacia with several loose flaps and some  grade 2 changes along the medial trochlea.  Using a combination of biters and Karen, the unstable flaps were debrided down to a stable rim.  No full-thickness cartilage loss was seen.. There is no evidence of loose bodies in the medial, lateral gutter. There was no evidence of significant synovitis.     Attention turned to the medial compartment. The medial femoral condyle was intact. The medial meniscus was intact.     Attention turned to the intercondylar notch. There was a complete tear of the anterior cruciate ligament. The 2 anatomic insertion points on the femur and the tibia were marked with a [employer] VAPR device down to the remnant of the ACL which was debrided down. A moderate inferior wall plasty was performed to allow for at least 17 mm between the two condyles     LATERAL MENISCUS REPAIR:  Attention was turned to the lateral compartment. The lateral femoral condyle was intact. The lateral meniscus had a tear along the posterior horn.  This was probed and found to be an unstable tear of the inferior leaf.  This did not extend completely to the superior leaf.  The decision was made to perform lateral meniscus repair with an all-inside technique.  After the tear site was cleaned up and lightly abraded to encourage healing, a total of 2 Arthrex fiber stitches were put in the usual fashion, just on the inferior leaf.  One throw was through the body of the meniscus, and 1 was into the posterior inferior capsule with careful attention to stay away from the posterior neurovascular structures.  After 2 sutures effectively repaired the posterior horn, the meniscus was stable to probe and no additional sutures were judged to be necessary..     The arthroscopic leg casey was removed to allow hyperflexion of the knee. The accessory medial portal was used to drill an anatomic femoral insertion point.     A guidewire, followed by a fluted reamer, gave us a 25 mm tunnel for our bone block in the femur. Excess bone was  removed and passing sutures were passed. The tibial insertion site was marked at the central aspect of the footprint of the tibial portion of the ACL. Extra-articularly, this was judged to be appropriate [facility] between the posteromedial border of the tibia and the tibial tubercle, or 24 mm from the edge of the tibial tubercle harvest site, or 30 mm from the central aspect of the tibial tubercle. The guide was set at 55 degrees. Guidewires, followed by a coring reamer to remove the excess bone to later bone graft the patella, and then a dilation up to a size 10 was used without difficulty.     The graft was taken from the back table and shuttled through the tibial tunnel and into the femoral tunnel. Excellent location was achieved. The cancellous side of the graft was anterior. Our , followed by an 8 x 20 mm metal interference screw was placed through our accessory medial portal with a cannula to protect our graft. This inserted with excellent fixation and further compressing our graft in an oblique manner to control rotational stability of the knee. The knee was cycled 20 times under 20 pounds of pressure and placed in full extension. The bone block was excellent length with small area of bone block protruding. The bone block was rotated 180-degrees to allow it to be flush with the tibia. A guidewire, , followed by a 9 x 20 mm metal interference screw was placed on the medial side of the graft. Excellent fixation was achieved. The knee had full extension without evidence of roof impingement. There was no evidence of PCL or lateral wall impingement. The knee had a negative Lachman, negative pivot shift after final reconstruction. The wounds were copiously irrigated.      LATERAL EXTRAARTICULAR TENODESIS:  A lateral curvilinear distal thigh incision of 6 cm was made extending distally towards Gerdy's tubercle. An approximate 8 cm x 1.5 cm strip of iliotibial band from its middle portion was then  harvested proximally and released all the way distal towards its Gerdy's tubercle attachment. A hemostat was used to passed this strip underneath the intact LCL. A knotted double loaded FiberTak all suture anchor was then placed just proximal and posterior to the femoral attachment of the LCL for the reconstruction. Secure fixation of the anchor was confirmed.  The tenodesis was completed with the knee in approximate tibia neutral position and flexed to 30°. Prior to suture fixation of the IT band slip to the anchor, the knee was then brought through a full range of motion to ensure appropriate tensioning and no knee capture or over-constraint.  The lateral extra-articular tenodesis fixation was secured pulling the graft through both of the tape limbs.  The more proximal one was tightened down and the other more distal was used to double back the graft through while twisting the tape to give better pull out strength.  The knee was ranged once more to confirm the reconstruction to be quite secure.     The bone graft was inserted into the patellar and tibial tubercle defects. The peritenon layer was closed with interrupted 0 Vicryl. The skin was closed with 0 Vicryl, 3-0 Vicryl, and 4-0 Monocryl. The wounds were covered with Mastisol, Steri-Strips, Xeroform, 4 x 4 fluffs, ABD pads, and thigh high MARZENA hose. The patient was placed in a hinged knee brace locked in extension, was extubated, and was transferred to the Recovery Room in stable condition, accompanied by his physician.     There were no complications in the case. I was present, scrubbed and did perform all critical portions of the case.     Postop plan is for him to proceed with a bone-patellar tendon-bone autograft ACL reconstruction with meniscus repair protocol.       RYAN MATTHEWS M.D.       Description of the Findings of the Procedure: above    Significant Surgical Tasks Conducted by the Assistant(s), if Applicable: none    Complications:  No    Estimated Blood Loss (EBL): * No values recorded between 5/19/2025  7:04 AM and 5/19/2025 10:38 AM *           Implants:   Implant Name Type Inv. Item Serial No.  Lot No. LRB No. Used Action   PIN GUIDE GRAFT PASS XACTPIN - [id number]  PIN GUIDE GRAFT PASS XACTPIN  [employer] [id number] Right 1 Implanted and Explanted   ANCHOR FIBERSTITCH 1.5 24D CRV - [id number]  ANCHOR FIBERSTITCH 1.5 24D CRV  [employer] [id number] Right 1 Implanted   ANCHOR FIBERSTITCH 1.5 24D CRV - [id number]  ANCHOR FIBERSTITCH 1.5 24D CRV  [employer] [id number] Right 1 Implanted   SCREW CANNULATED 9 X 20MM - [id number]  SCREW CANNULATED 9 X 20MM  [employer] [id number] Right 1 Implanted   SCREW SHTH NATASHA INTFR 8X20MM - [id number]  SCREW SHTH NATASHA INTFR 8X20MM  [employer] [id number] Right 1 Implanted   SYS SWIVELOCK ANCH 4.75X19.1MM - [id number]  SYS SWIVELOCK ANCH 4.75X19.1MM  [employer] [id number] Right 1 Implanted   ANCHOR SUT FIBERTAK 2 LOD - [id number]  ANCHOR SUT FIBERTAK 2 LOD  [employer] [id number] Right 1 Implanted and Explanted   ANCHOR SUT FIBERTAK 2 LOD - [id number]  ANCHOR SUT FIBERTAK 2 LOD  [employer] [id number] Right 1 Implanted   [id number]  FIBER CORTICAL ENHANCE 2.5CC [id number] [id number]  Right 1 Implanted       Specimens:   Specimen (24h ago, onward)      None                    Condition: Good    Disposition: PACU - hemodynamically stable.    Attestation: I was present and scrubbed for the entire procedure.    Discharge Note    SUMMARY     Admit Date: 5/19/2025    Discharge Date and Time: 05/19/2025 11:52 AM    Hospital Course (synopsis of major diagnoses, care, treatment, and services provided during the course of the hospital stay): outpatient surgery     Final Diagnosis: Post-Op Diagnosis Codes:     * Rupture of anterior cruciate ligament of right knee, initial encounter [S83.511A]     * Peripheral tear of lateral meniscus of right knee as current injury, subsequent encounter [S83.261D]    Disposition: Home or Self Care    Follow  Up/Patient Instructions:     Medications:  Reconciled Home Medications:      Medication List        CONTINUE taking these medications      aspirin 325 MG tablet  Take 1 tablet (325 mg total) by mouth once daily. for 21 days     ibuprofen 200 MG tablet  Commonly known as: ADVIL,MOTRIN  Take 200 mg by mouth every 6 (six) hours as needed for Pain.     meloxicam 7.5 MG tablet  Commonly known as: MOBIC  Take 1 tablet (7.5 mg total) by mouth once daily.     norethindrone 5 mg Tab  Commonly known as: AYGESTIN  Take 1 tablet (5 mg total) by mouth once daily. TAKE D 1-10 Q MONTH     oxyCODONE-acetaminophen  mg per tablet  Commonly known as: PERCOCET  Take 1 tablet by mouth every 6 (six) hours as needed for Pain.     PRENATAL #48-IRON [id number] ORAL  Take by mouth.     promethazine 25 MG tablet  Commonly known as: PHENERGAN  Take 1 tablet (25 mg total) by mouth every 6 (six) hours as needed for Nausea.     traMADoL 50 mg tablet  Commonly known as: ULTRAM  Take 1 tablet (50 mg total) by mouth every 6 (six) hours as needed for Pain.            ASK your doctor about these medications      amoxicillin-clavulanate 875-125mg 875-125 mg per tablet  Commonly known as: AUGMENTIN  Take 1 tablet by mouth 2 (two) times daily.     clotrimazole-betamethasone 1-0.05% cream  Commonly known as: LOTRISONE  Apply to affected area 2 times daily     ondansetron 4 MG Tbdl  Commonly known as: ZOFRAN-ODT  Take 1 tablet (4 mg total) by mouth every 8 (eight) hours as needed (Nausea/Vomiting).            Discharge Procedure Orders   Diet general     Call MD for:  temperature >100.4     Call MD for:  persistent nausea and vomiting     Call MD for:  severe uncontrolled pain     Call MD for:  difficulty breathing, headache or visual disturbances     Call MD for:  redness, tenderness, or signs of infection (pain, swelling, redness, odor or green/yellow discharge around incision site)     Call MD for:  hives     Call MD for:  persistent dizziness  or light-headedness

## 2025-05-19 NOTE — PLAN OF CARE
Pre Op completed with no questions or distress at this time.  Belongings locked in locker and patient has crutches in car.

## 2025-05-20 NOTE — ANESTHESIA POSTPROCEDURE EVALUATION
Anesthesia Post Evaluation    Patient: Ron Devine    Procedure(s) Performed: Procedure(s) (LRB):  RECONSTRUCTION, KNEE, ACL, ARTHROSCOPIC WITH BTB AUTOGRAPH (Right)  ARTHROSCOPY,KNEE,WITH  LATERAL MENISCUS REPAIR (Right)  RECONSTRUCTION, LIGAMENT, KNEE, EXTRA-ARTICULAR (Right)  ARTHROSCOPY, KNEE, WITH CHONDROPLASTY (Right)    Final Anesthesia Type: general      Patient location during evaluation: PACU  Patient participation: Yes- Able to Participate  Level of consciousness: awake and alert and oriented  Post-procedure vital signs: reviewed and stable  Pain management: adequate  Airway patency: patent  DEANNA mitigation strategies: Use of major conduction anesthesia (spinal/epidural) or peripheral nerve block and Multimodal analgesia  PONV status at discharge: No PONV  Anesthetic complications: no      Cardiovascular status: blood pressure returned to baseline and hemodynamically stable  Respiratory status: unassisted, spontaneous ventilation and room air  Hydration status: euvolemic  Follow-up not needed.              Vitals Value Taken Time   /62 05/19/25 12:16   Temp 36.7 °C (98 °F) 05/19/25 12:15   Pulse 85 05/19/25 12:28   Resp 26 05/19/25 12:27   SpO2 99 % 05/19/25 12:28   Vitals shown include unfiled device data.      Event Time   Out of Recovery 11:40:00         Pain/Griselda Score: Pain Rating Prior to Med Admin: 9 (5/19/2025 11:55 AM)  Pain Rating Post Med Admin: 8 (5/19/2025 11:44 AM)  Griselda Score: 10 (5/19/2025 11:44 AM)

## 2025-05-20 NOTE — ANESTHESIA POST-OP PAIN MANAGEMENT
"Acute Pain Service Progress Note    5/20/2025 1255    Patient contacted regarding CADD infusion follow up. Reports that pain has been reasonably well controlled with the infusion pump in addition to pain medication as needed. Denies signs of local anesthetic toxicity. PNC dressing remains clean, dry, and intact. All questions answered. Reminded patient that the infusion should be discontinued and PNC removed whenever the "infusion complete" alert is seen on the display screen or by POD 5 (5/24/25) at 12pm, whichever comes first. Encouraged patient to call the CADD 24/7 support line at [phone] or the [facility] Anest[facility] line at [phone] for any CADD related questions/issues. Verbalizes understanding.        "

## 2025-05-30 ENCOUNTER — OFFICE VISIT (OUTPATIENT)
Dept: SPORTS MEDICINE | Facility: CLINIC | Age: 35
End: 2025-05-30
Payer: COMMERCIAL

## 2025-05-30 ENCOUNTER — HOSPITAL ENCOUNTER (OUTPATIENT)
Dept: RADIOLOGY | Facility: HOSPITAL | Age: 35
Discharge: HOME | End: 2025-05-30
Attending: PHYSICIAN ASSISTANT
Payer: COMMERCIAL

## 2025-05-30 VITALS
BODY MASS INDEX: 39.67 KG/M2 | HEIGHT: 60 IN | SYSTOLIC BLOOD PRESSURE: 141 MMHG | WEIGHT: 202.06 LBS | DIASTOLIC BLOOD PRESSURE: 83 MMHG | HEART RATE: 83 BPM

## 2025-05-30 DIAGNOSIS — Z98.890 S/P ACL RECONSTRUCTION: ICD-10-CM

## 2025-05-30 DIAGNOSIS — Z09 SURGERY FOLLOW-UP EXAMINATION: Primary | ICD-10-CM

## 2025-05-30 DIAGNOSIS — Z09 SURGERY FOLLOW-UP EXAMINATION: ICD-10-CM

## 2025-05-30 PROCEDURE — 99999 PR PBB SHADOW E&M-EST. PATIENT-LVL IV: CPT | Mod: PBBFAC,,, | Performed by: PHYSICIAN ASSISTANT

## 2025-05-30 PROCEDURE — 73560 X-RAY EXAM OF KNEE 1 OR 2: CPT | Mod: TC,RT

## 2025-05-30 PROCEDURE — 73560 X-RAY EXAM OF KNEE 1 OR 2: CPT | Mod: 26,RT,, | Performed by: RADIOLOGY

## 2025-05-30 NOTE — PROGRESS NOTES
CC: Right knee post op 2 weeks    Patient is here for her 2 week post op appointment s/p below and is doing well. Patient is doing PT at [facility] in [facility] and is progressing as expected. Patient is  taking pain medication approximately 2x/day as needed for pain. she denies any chest pain, SOB, fevers, chills, nausea, vomiting, or drainage from incision sites.     DATE OF PROCEDURE: 5/19/2025       PREOPERATIVE DIAGNOSES:   1. Right knee anterior cruciate ligament tear.   2. Right knee instability  3. Right knee lateral meniscus tear  4. Right knee chondromalacia     POSTOPERATIVE DIAGNOSES:   1. Right knee anterior cruciate ligament tear.   2. Right knee instability  3. Right knee lateral meniscus tear  4. Right knee chondromalacia     PROCEDURES:   1. Right knee anterior cruciate ligament reconstruction with ipsilateral bone-patellar tendon-bone autograft.   2. Right knee open lateral extra-articular tenodesis modified Alcides reconstruction (CPT 73364)  3. Right knee lateral meniscus repair  4. Right knee arthroscopic chondroplasty     SURGEON: Blade Blount M.D.    PE:    BP (!) 141/83 (BP Location: Right arm, Patient Position: Sitting)   Pulse 83   Ht 5' (1.524 m)   Wt 91.7 kg (202 lb 0.8 oz)   LMP 04/25/2025 (Exact Date)   BMI 39.46 kg/m²      Right knee (5/30/2025):      See above.  It appears that the adhesive portion of the bandages was placed directly over her incisions which has led to some moisture being able to reach her incisions.  There is no obvious signs of infection.  No foul odor.  There is some areas of delayed healing/mild dehiscence which was reinforced with Steri-Strips and Medipore island dressings.  Mild swelling  Compartments soft  Neurovascular status intact in extremity    PROM 0 to 75 degrees  Decreased quad strength  Minimal to no effusion    X-rays right knee (5/30/2025):  Status post ACL reconstruction.  Tibial and femoral interference screws are in good position without  any evidence of perihardware fracture/complication.  Expected postoperative swelling present.    Assessment:  2 weeks s/p right knee ACL reconstruction with BTB autograft, lateral extra-articular tenodesis, lateral meniscus repair, chondroplasty    Plan:  1.  Removed surgical dressings.  Incisions were reinforced with Steri-Strips and Medipore island dressings before wrapping the knee with an Ace wrap.  Patient was instructed to keep incisions dry until her next appointment with me.    2.  Arthroscopic pictures reviewed and rehab plans discussed.    3.  Continue Physical therapy for quad, ROM, modalities.  HEP for ROM, knee, VMO and hip abductor strengthening.     4.  Pain level acceptable for first post op visit.  Wean off pain medicine by next visit if still taking    5. Return to clinic in 1 week for postop wound check.      All questions were answered. Instructed patient to call with questions or concerns in the interim.       Medical Dictation software was used during the dictation of portions or the entirety of this medical record.  Phonetic or grammatic errors may exist due to the use of this software. For clarification, refer to the author of the document.

## 2025-06-06 ENCOUNTER — OFFICE VISIT (OUTPATIENT)
Dept: SPORTS MEDICINE | Facility: CLINIC | Age: 35
End: 2025-06-06
Payer: COMMERCIAL

## 2025-06-06 VITALS
HEART RATE: 94 BPM | WEIGHT: 200.75 LBS | BODY MASS INDEX: 39.2 KG/M2 | SYSTOLIC BLOOD PRESSURE: 115 MMHG | DIASTOLIC BLOOD PRESSURE: 76 MMHG

## 2025-06-06 DIAGNOSIS — S83.511A RUPTURE OF ANTERIOR CRUCIATE LIGAMENT OF RIGHT KNEE, INITIAL ENCOUNTER: ICD-10-CM

## 2025-06-06 DIAGNOSIS — Z98.890 S/P ACL RECONSTRUCTION: ICD-10-CM

## 2025-06-06 DIAGNOSIS — Z48.89 ENCOUNTER FOR POST SURGICAL WOUND CHECK: ICD-10-CM

## 2025-06-06 DIAGNOSIS — Z09 SURGERY FOLLOW-UP EXAMINATION: Primary | ICD-10-CM

## 2025-06-06 PROCEDURE — 99999 PR PBB SHADOW E&M-EST. PATIENT-LVL III: CPT | Mod: PBBFAC,,, | Performed by: PHYSICIAN ASSISTANT

## 2025-06-06 RX ORDER — OXYCODONE AND ACETAMINOPHEN 5; 325 MG/1; MG/1
1 TABLET ORAL EVERY 8 HOURS PRN
Qty: 21 TABLET | Refills: 0 | Status: SHIPPED | OUTPATIENT
Start: 2025-06-06

## 2025-06-06 RX ORDER — PROMETHAZINE HYDROCHLORIDE 25 MG/1
25 TABLET ORAL EVERY 6 HOURS PRN
Qty: 10 TABLET | Refills: 0 | Status: SHIPPED | OUTPATIENT
Start: 2025-06-06

## 2025-07-01 ENCOUNTER — OFFICE VISIT (OUTPATIENT)
Dept: SPORTS MEDICINE | Facility: CLINIC | Age: 35
End: 2025-07-01
Payer: COMMERCIAL

## 2025-07-01 VITALS
DIASTOLIC BLOOD PRESSURE: 70 MMHG | HEART RATE: 80 BPM | WEIGHT: 197.63 LBS | BODY MASS INDEX: 38.8 KG/M2 | HEIGHT: 60 IN | SYSTOLIC BLOOD PRESSURE: 103 MMHG

## 2025-07-01 DIAGNOSIS — Z98.890 S/P ACL RECONSTRUCTION: Primary | ICD-10-CM

## 2025-07-01 PROCEDURE — 3078F DIAST BP <80 MM HG: CPT | Mod: CPTII,S$GLB,, | Performed by: ORTHOPAEDIC SURGERY

## 2025-07-01 PROCEDURE — 1159F MED LIST DOCD IN RCRD: CPT | Mod: CPTII,S$GLB,, | Performed by: ORTHOPAEDIC SURGERY

## 2025-07-01 PROCEDURE — 99999 PR PBB SHADOW E&M-EST. PATIENT-LVL III: CPT | Mod: PBBFAC,,, | Performed by: ORTHOPAEDIC SURGERY

## 2025-07-01 PROCEDURE — 99024 POSTOP FOLLOW-UP VISIT: CPT | Mod: S$GLB,,, | Performed by: ORTHOPAEDIC SURGERY

## 2025-07-01 PROCEDURE — 3074F SYST BP LT 130 MM HG: CPT | Mod: CPTII,S$GLB,, | Performed by: ORTHOPAEDIC SURGERY

## 2025-07-01 NOTE — PROGRESS NOTES
CC: Right knee post op 6 weeks     Patient is here for her 6 week post op appointment/wound check s/p below. Patient is doing PT at [facility] in [facility] and is progressing as expected.     DATE OF PROCEDURE: 5/19/2025       PREOPERATIVE DIAGNOSES:   1. Right knee anterior cruciate ligament tear.   2. Right knee instability  3. Right knee lateral meniscus tear  4. Right knee chondromalacia     POSTOPERATIVE DIAGNOSES:   1. Right knee anterior cruciate ligament tear.   2. Right knee instability  3. Right knee lateral meniscus tear  4. Right knee chondromalacia     PROCEDURES:   1. Right knee anterior cruciate ligament reconstruction with ipsilateral bone-patellar tendon-bone autograft.   2. Right knee open lateral extra-articular tenodesis modified Alcides reconstruction (CPT 61833)  3. Right knee lateral meniscus repair  4. Right knee arthroscopic chondroplasty     SURGEON: Blade Blount M.D.    PE:    /70   Pulse 80   Ht 5' (1.524 m)   Wt 89.7 kg (197 lb 10.3 oz)   BMI 38.60 kg/m²      Right knee   Incisions clean, dry, intact and healing well.  No visible erythema or discharge present.  Mild swelling  Compartments soft  Neurovascular status intact in extremity    PROM 0 to 85 degrees  Decreased quad strength  Minimal to no effusion    X-rays right knee (5/30/2025):  Status post ACL reconstruction.  Tibial and femoral interference screws are in good position without any evidence of perihardware fracture/complication.  Expected postoperative swelling present.    Assessment:  6 weeks s/p right knee ACL reconstruction with BTB autograft, lateral extra-articular tenodesis, lateral meniscus repair, chondroplasty    Plan:  1.  Continue PT    2. Discontinue tscope    3. Wean off of crutches.         All questions were answered. Instructed patient to call with questions or concerns in the interim.

## 2025-07-11 ENCOUNTER — LAB VISIT (OUTPATIENT)
Dept: LAB | Facility: HOSPITAL | Age: 35
End: 2025-07-11
Attending: STUDENT IN AN ORGANIZED HEALTH CARE EDUCATION/TRAINING PROGRAM
Payer: COMMERCIAL

## 2025-07-11 ENCOUNTER — OFFICE VISIT (OUTPATIENT)
Dept: PRIMARY CARE CLINIC | Facility: CLINIC | Age: 35
End: 2025-07-11
Payer: COMMERCIAL

## 2025-07-11 VITALS
HEART RATE: 88 BPM | OXYGEN SATURATION: 99 % | SYSTOLIC BLOOD PRESSURE: 100 MMHG | WEIGHT: 191.81 LBS | HEIGHT: 60 IN | DIASTOLIC BLOOD PRESSURE: 70 MMHG | BODY MASS INDEX: 37.66 KG/M2

## 2025-07-11 DIAGNOSIS — Z12.4 SCREENING FOR CERVICAL CANCER: ICD-10-CM

## 2025-07-11 DIAGNOSIS — Z00.00 ENCOUNTER FOR PREVENTATIVE ADULT HEALTH CARE EXAMINATION: ICD-10-CM

## 2025-07-11 DIAGNOSIS — N93.9 ABNORMAL UTERINE BLEEDING (AUB): ICD-10-CM

## 2025-07-11 DIAGNOSIS — Z11.3 ROUTINE SCREENING FOR STI (SEXUALLY TRANSMITTED INFECTION): ICD-10-CM

## 2025-07-11 DIAGNOSIS — Z86.39 HISTORY OF VITAMIN D DEFICIENCY: ICD-10-CM

## 2025-07-11 DIAGNOSIS — E66.812 CLASS 2 OBESITY: ICD-10-CM

## 2025-07-11 DIAGNOSIS — Z00.00 ENCOUNTER FOR PREVENTATIVE ADULT HEALTH CARE EXAMINATION: Primary | ICD-10-CM

## 2025-07-11 DIAGNOSIS — Z13.1 SCREENING FOR DIABETES MELLITUS: ICD-10-CM

## 2025-07-11 DIAGNOSIS — Z13.220 SCREENING FOR HYPERLIPIDEMIA: ICD-10-CM

## 2025-07-11 LAB
ALBUMIN SERPL BCP-MCNC: 4.4 G/DL (ref 3.5–5.2)
ALP SERPL-CCNC: 77 UNIT/L (ref 40–150)
ALT SERPL W/O P-5'-P-CCNC: 13 UNIT/L (ref 10–44)
ANION GAP (OHS): 9 MMOL/L (ref 8–16)
AST SERPL-CCNC: 14 UNIT/L (ref 11–45)
BILIRUB SERPL-MCNC: 0.2 MG/DL (ref 0.1–1)
BUN SERPL-MCNC: 10 MG/DL (ref 6–20)
CALCIUM SERPL-MCNC: 9.3 MG/DL (ref 8.7–10.5)
CHLORIDE SERPL-SCNC: 102 MMOL/L (ref 95–110)
CHOLEST SERPL-MCNC: 175 MG/DL (ref 120–199)
CHOLEST/HDLC SERPL: 2.7 {RATIO} (ref 2–5)
CO2 SERPL-SCNC: 25 MMOL/L (ref 23–29)
CREAT SERPL-MCNC: 0.6 MG/DL (ref 0.5–1.4)
EAG (OHS): 120 MG/DL (ref 68–131)
ERYTHROCYTE [DISTWIDTH] IN BLOOD BY AUTOMATED COUNT: 14 % (ref 11.5–14.5)
GFR SERPLBLD CREATININE-BSD FMLA CKD-EPI: >60 ML/MIN/1.73/M2
GLUCOSE SERPL-MCNC: 94 MG/DL (ref 70–110)
HBA1C MFR BLD: 5.8 % (ref 4–5.6)
HCT VFR BLD AUTO: 38 % (ref 37–48.5)
HCV AB SERPL QL IA: NORMAL
HDLC SERPL-MCNC: 64 MG/DL (ref 40–75)
HDLC SERPL: 36.6 % (ref 20–50)
HGB BLD-MCNC: 12 GM/DL (ref 12–16)
HIV 1+2 AB+HIV1 P24 AG SERPL QL IA: NORMAL
LDLC SERPL CALC-MCNC: 100.2 MG/DL (ref 63–159)
MCH RBC QN AUTO: 28.7 PG (ref 27–31)
MCHC RBC AUTO-ENTMCNC: 31.6 G/DL (ref 32–36)
MCV RBC AUTO: 91 FL (ref 82–98)
NONHDLC SERPL-MCNC: 111 MG/DL
PLATELET # BLD AUTO: 209 K/UL (ref 150–450)
PMV BLD AUTO: 12.3 FL (ref 9.2–12.9)
POTASSIUM SERPL-SCNC: 3.8 MMOL/L (ref 3.5–5.1)
PROT SERPL-MCNC: 7.8 GM/DL (ref 6–8.4)
RBC # BLD AUTO: 4.18 M/UL (ref 4–5.4)
SODIUM SERPL-SCNC: 136 MMOL/L (ref 136–145)
T PALLIDUM IGG+IGM SER QL: NORMAL
TRIGL SERPL-MCNC: 54 MG/DL (ref 30–150)
TSH SERPL-ACNC: 2.01 UIU/ML (ref 0.4–4)
WBC # BLD AUTO: 7.86 K/UL (ref 3.9–12.7)

## 2025-07-11 PROCEDURE — 99999 PR PBB SHADOW E&M-EST. PATIENT-LVL V: CPT | Mod: PBBFAC,,, | Performed by: STUDENT IN AN ORGANIZED HEALTH CARE EDUCATION/TRAINING PROGRAM

## 2025-07-11 PROCEDURE — 84443 ASSAY THYROID STIM HORMONE: CPT

## 2025-07-11 PROCEDURE — 80061 LIPID PANEL: CPT

## 2025-07-11 PROCEDURE — 85027 COMPLETE CBC AUTOMATED: CPT

## 2025-07-11 PROCEDURE — 86803 HEPATITIS C AB TEST: CPT

## 2025-07-11 PROCEDURE — 36415 COLL VENOUS BLD VENIPUNCTURE: CPT | Mod: PN

## 2025-07-11 PROCEDURE — 83036 HEMOGLOBIN GLYCOSYLATED A1C: CPT

## 2025-07-11 PROCEDURE — 86593 SYPHILIS TEST NON-TREP QUANT: CPT

## 2025-07-11 PROCEDURE — 87389 HIV-1 AG W/HIV-1&-2 AB AG IA: CPT

## 2025-07-11 PROCEDURE — 80053 COMPREHEN METABOLIC PANEL: CPT

## 2025-07-11 PROCEDURE — 82652 VIT D 1 25-DIHYDROXY: CPT

## 2025-07-11 RX ORDER — ERGOCALCIFEROL 1.25 MG/1
50000 CAPSULE ORAL
COMMUNITY

## 2025-07-11 NOTE — PATIENT INSTRUCTIONS
For weight loss - 90 -150 minutes/week of aerobic exercise at 65-75% maximal predicted heart rate or  minutes/week of resistance exercise   Try spin classes or biking because it is more gentle on your joints     No soda, sweet tea, juices or lemonade. All drinks should be 5 calories or less.     Food and Beverage Log:  Keep a log of all food and beverages that you consume.  Keeping track of calories will help you lose weight, because monitoring will help you become more aware of what you are eating and recognize your eating patterns.  Be mindful of your hunger level before eating and your satiety level after eating.  Just keeping records will help you make healthy changes in your diet and eat fewer calories.        Tips for keeping a calorie count:      Each day, aim for the daily calorie goal.      Record your food intake immediately after eating. If possible, look up calories before or immediately after eating.      Download an yarely like [employer] Fitness Pal, Lose It!, or Eigenta (Code: 45189) To keep track of your calories.     Measuring foods (using measuring cups or spoons) will help you be more accurate with counting calories.      Keep a running calorie count throughout the day.      Count daily calories toward a weekly calorie total. If you eat too many calories one day, cut back slightly over the next few days to meet your weekly goal.         Meal Planning & Grocery Shopping     Meal planning builds the foundation for healthy eating. When you have structured ideas for healthy meals and foods available at home to prepare those meals, weight control becomes easier.  If only healthy foods are available at home, then you will be much more likely to eat healthy foods. And you will be less likely to go to a restaurant or  a fast food meal, which tend to be unhealthy and higher in calories than meals prepared at home.       Take 5-10 minutes each week to plan meals for the next 7 days.  Make a grocery  list based on the meal plan.     Grocery Shopping Tips:  Shop on a full stomach.  Schedule your shopping for times when you are most motivated and able to be disciplined about your purchases. For example, after a stressful day at work it may be difficult to make the healthiest choices. Shopping at other times, such as early in the morning or after dinner, may be easier.  Focus your shopping on the outside aisles of the store, which tend to contain more fresh foods and lower calorie foods. The inside aisles tend to have more processed foods.  Stick to your list. Avoid buying unhealthy items just because they are on sale.   Compare nutrition labels to check the number of calories and percentage of fat.        What to buy:     Vegetables  Fresh vegetables  Frozen vegetables with no sauce or added salt  Canned vegetables with no sauce or added salt     Protein  Lean meats, such as chicken and turkey  Limit red meats, such as beef to no more than 1x/week  Limit processed meats, such as cold cuts, giordano, sausage, and hot dogs. Look for brands that have no nitrites and are minimally processed. Consider turkey sausage or turkey giordano.  Fish and Shellfish  Eggs  Dried beans  Canned beans (reduced sodium)     Fat  Use healthy oils, such as olive oil or canola oil, for cooking, salad dressings, etc.  Unflavored nuts and seeds  Nut butters (no added sugar)     Dairy  Yogurt (no sugar added)  Cheese  Low-fat milk  Unsweetened nondairy milks (almond milk, soy milk, etc)     Fruit  Fresh Fruit  Frozen fruit with no added sugar  Canned fruit with no added sugar  Dried fruit with no added sugar  100% fruit juice     Whole Grains  Single ingredient grains, such as oats, quinoa, brown rice  Whole-wheat pasta  Sprouted whole-grain bread     What to avoid:  - Avoid fried foods  - Avoid foods with added sugar  - Avoid sugar-sweetened beverages  - Avoid ultra-processed foods                Copyright © 2011, [employer]. For more  information about The Healthy Eating Plate, please see The Nutrition Source, Department of Nutrition, [employer], www.thenutritionsource.org, and [employer], www.health.[employer].edu.

## 2025-07-11 NOTE — PROGRESS NOTES
"Primary Care  Annual Office Visit - In Person  7/11/2025          Patient is a 34 y.o.   Ron Devine  has a past medical history of Migraines.    History of Present Illness    CHIEF COMPLAINT:  Patient presents today for annual visit     MUSCULOSKELETAL:  She underwent knee surgery in May following a fall from monkey bars.    GYNECOLOGIC:  She reports regular but variable menstrual cycles, with flow ranging from very light to very heavy. She experiences cyclical mood changes approximately one week prior to menses. She endorses symptoms potentially associated with uterine fibroids including pelvic cramping, heavy periods, blood clots, bloating, constipation, and frequent urination. She denies missed periods.    VASOMOTOR SYMPTOMS:  She experiences hot flashes predominantly at night. During daytime episodes, she occasionally experiences associated lightheadedness requiring her to sit down. She uses a personal fan at work for symptom management. These episodes occur frequently and are not directly related to her menstrual cycle.       Active Medications  Current Outpatient Medications   Medication Instructions    ergocalciferol (ERGOCALCIFEROL) 50,000 Units, Every 7 days    hpv vaccine,9-artur (GARDASIL 9, PF,) 0.5 mL Syrg 0.5 mLs, Intramuscular, Once    meloxicam (MOBIC) 7.5 mg, Oral, Daily       Annual Review of Preventative Care    Health Maintenance Due   Topic Date Due    Cervical Cancer Screening  Never done    Hemoglobin A1c (Diabetic Prevention Screening)  Never done    COVID-19 Vaccine (3 - 2024-25 season) 09/01/2024     Diabetes Screening:  No results found for: "LABGLYC", "HEMOGLOBINA1", "HGBA1C"  BP Readings from Last 3 Encounters:   07/11/25 100/70   07/01/25 103/70   06/06/25 115/76     Wt Readings from Last 3 Encounters:   07/11/25 0921 87 kg (191 lb 12.8 oz)   07/01/25 1127 89.7 kg (197 lb 10.3 oz)   06/06/25 1023 91 kg (200 lb 11.7 oz)            Physical Exam  Vitals reviewed. "   Constitutional:       General: She is not in acute distress.  Eyes:      Pupils: Pupils are equal, round, and reactive to light.   Cardiovascular:      Rate and Rhythm: Normal rate and regular rhythm.      Pulses: Normal pulses.      Heart sounds: Normal heart sounds.   Pulmonary:      Effort: Pulmonary effort is normal.      Breath sounds: Normal breath sounds.   Abdominal:      General: Abdomen is flat. Bowel sounds are normal.      Palpations: Abdomen is soft.   Musculoskeletal:      Right lower leg: No edema.      Left lower leg: No edema.             Assessment & Plan      ABNORMAL UTERINE BLEEDING:  Noted heavy periods and passing blood clots. This will be further evaluated with pelvic US.    NIGHT SWEATS:  No fever or unintentional weight loss reported.  Considered ruling out other physiological causes such as thyroid dysfunction before attributing symptoms to perimenopause.  Provided information on treatment options including paroxetine for vasomotor symptoms and hormone replacement therapy as an option to consider with a gynecologist.    VITAMIN D DEFICIENCY:  Monitor    ENCOUNTER FOR PREVENTIVE CARE:  CBC  CMP  TSH    SCREENING HLD:  Lipid panel     SCREENING DM:  HbA1C    SCREENING CERVICAL CANCER:  Referral GYN     STI SCREENING:  Completed     CLASS 2 OBESITY:   Referred to nutritionist       Orders Placed This Encounter    US Transvaginal Non OB    Lipid Panel    Hemoglobin A1C    Comprehensive Metabolic Panel    TSH    Hepatitis C Antibody    HIV 1/2 Ag/Ab (4th Gen)    C. trachomatis/N. gonorrhoeae by AMP DNA [facility]; Urine    Treponema Pallidium Antibodies IgG, IgM    Calcitriol    CBC Without Differential    Ambulatory referral/consult to Gynecology    Ambulatory Referral/Consult to Lifestyle Nutrition                             Upcoming Scheduled Appointments and Follow Up:    Future Appointments   Date Time Provider Department Center   7/11/2025 11:30 AM LAB, [facility]    8/26/2025 11:30 AM Blade Blount MD [facility]       Follow Up [facility] (with who? when?): No follow-ups on file.        Extended Emergency Contact Information  Primary Emergency Contact: Chris Perera  Mobile Phone: [phone]  Relation: Significant other  Preferred language: English   needed? No      Tommy Pierce MD   Internal Medicine  7/11/2025 - 10:49 AM    I spent a total of 30 minutes on the day of the visit.This includes face to face time and non-face to face time preparing to see the patient (eg, review of tests), obtaining and/or reviewing separately obtained history, documenting clinical information in the electronic or other health record, independently interpreting results and communicating results to the patient/family/caregiver, or care coordinator.    This note was generated with the assistance of ambient listening technology. Verbal consent was obtained by the patient and accompanying visitor(s) for the recording of patient appointment to facilitate this note. I attest to having reviewed and edited the generated note for accuracy, though some syntax or spelling errors may persist. Please contact the author of this note for any clarification.      While patients have the right to access their medical record, it is essential to recognize that progress notes primarily serve as a means of communication among healthcare professionals.

## 2025-07-14 LAB — W VITAMIN D, 1, 25-DIHYDROXY: 44 PG/ML

## 2025-07-15 PROBLEM — R73.03 PREDIABETES: Status: ACTIVE | Noted: 2025-07-15

## 2025-08-26 ENCOUNTER — OFFICE VISIT (OUTPATIENT)
Dept: SPORTS MEDICINE | Facility: CLINIC | Age: 35
End: 2025-08-26
Payer: COMMERCIAL

## 2025-08-26 VITALS
HEART RATE: 80 BPM | DIASTOLIC BLOOD PRESSURE: 85 MMHG | WEIGHT: 193.13 LBS | BODY MASS INDEX: 37.72 KG/M2 | SYSTOLIC BLOOD PRESSURE: 126 MMHG

## 2025-08-26 DIAGNOSIS — Z98.890 S/P ACL RECONSTRUCTION: Primary | ICD-10-CM

## 2025-08-26 DIAGNOSIS — Z09 SURGERY FOLLOW-UP EXAMINATION: ICD-10-CM

## 2025-08-26 PROCEDURE — 99999 PR PBB SHADOW E&M-EST. PATIENT-LVL III: CPT | Mod: PBBFAC,,, | Performed by: ORTHOPAEDIC SURGERY

## 2025-08-26 PROCEDURE — 99024 POSTOP FOLLOW-UP VISIT: CPT | Mod: S$GLB,,, | Performed by: ORTHOPAEDIC SURGERY

## 2025-08-26 PROCEDURE — 3044F HG A1C LEVEL LT 7.0%: CPT | Mod: CPTII,S$GLB,, | Performed by: ORTHOPAEDIC SURGERY

## 2025-08-26 PROCEDURE — 3079F DIAST BP 80-89 MM HG: CPT | Mod: CPTII,S$GLB,, | Performed by: ORTHOPAEDIC SURGERY

## 2025-08-26 PROCEDURE — 1159F MED LIST DOCD IN RCRD: CPT | Mod: CPTII,S$GLB,, | Performed by: ORTHOPAEDIC SURGERY

## 2025-08-26 PROCEDURE — 3074F SYST BP LT 130 MM HG: CPT | Mod: CPTII,S$GLB,, | Performed by: ORTHOPAEDIC SURGERY

## 2025-09-05 ENCOUNTER — TELEPHONE (OUTPATIENT)
Dept: SPORTS MEDICINE | Facility: CLINIC | Age: 35
End: 2025-09-05
Payer: COMMERCIAL

## (undated) DEVICE — PAD ELECTRODE STER 1.5X3

## (undated) DEVICE — SUT SUTURETAPE TIGERLINK 1.3MM

## (undated) DEVICE — BLADE SURG #15 CARBON STEEL

## (undated) DEVICE — DRAPE ARTHSCP FLD CTRL POUCH

## (undated) DEVICE — DRESSING XEROFORM NONADH 1X8IN

## (undated) DEVICE — BLADE ACL FINE 9.5X25.5X.4MM

## (undated) DEVICE — SUT TAPE FIBERLOOP 1.3MM

## (undated) DEVICE — BNDG COFLEX FOAM LF2 ST 6X5YD

## (undated) DEVICE — YANKAUER OPEN TIP W/O VENT

## (undated) DEVICE — PROBE ARTHSCP RF90 D 140MM

## (undated) DEVICE — GLOVE SENSICARE PI GRN 7

## (undated) DEVICE — BIT DRILL SENTINEL 10.5MMX9IN

## (undated) DEVICE — NDL HYPO STD REG BVL 18GX1.5IN

## (undated) DEVICE — COVER TABLE REINF 50X90IN

## (undated) DEVICE — BANDAGE ESMARK 6X12

## (undated) DEVICE — UNDERGLOVES BIOGEL PI SIZE 8

## (undated) DEVICE — KIT INST DISP KNEE FIBERTAK

## (undated) DEVICE — ELECTRODE REM PLYHSV RETURN 9

## (undated) DEVICE — Device

## (undated) DEVICE — GOWN ECLIPSE REINF LV4 XLNG XL

## (undated) DEVICE — SUT MCRYL PLUS 4-0 PS2 27IN

## (undated) DEVICE — SUT VICRYL PLUS 3-0 SH 18IN

## (undated) DEVICE — SPONGE COTTON TRAY 4X4IN

## (undated) DEVICE — SUT VICRYL PLUS 0 CT1 18IN

## (undated) DEVICE — CLOSURE SKIN STERI STRIP 1/2X4

## (undated) DEVICE — KNIFE ACL GRAFT 10MM

## (undated) DEVICE — MARKER SKIN RULER STERILE

## (undated) DEVICE — GLOVE SENSICARE PI SURG 7

## (undated) DEVICE — CONTAINER SPECIMEN OR STER 4OZ

## (undated) DEVICE — COVER MAYO STND XL 30X57IN

## (undated) DEVICE — DRAPE STERI U-SHAPED 47X51IN

## (undated) DEVICE — CUTTER SUT KNOT PUSH PRTL SKID

## (undated) DEVICE — SOL NACL IRR 1000ML BTL

## (undated) DEVICE — SYR 30CC LUER LOCK

## (undated) DEVICE — SUT VICRYL 0 27 CT-2

## (undated) DEVICE — ADHESIVE DERMABOND ADVANCED

## (undated) DEVICE — COVER CAMERA OPERATING ROOM

## (undated) DEVICE — COLLECTOR GRAFTNET TISS AUTOLG

## (undated) DEVICE — SOL NACL IRR 3000ML

## (undated) DEVICE — DRAPE STERI INSTRUMENT 1018

## (undated) DEVICE — TOURNIQUET SB QC DP 34X4IN

## (undated) DEVICE — XACTPIN GRAFT PASSING GUIDE PIN, STERILE, 2.4 X 432 MM (.093 X 17 IN.)
Type: IMPLANTABLE DEVICE | Site: KNEE | Status: NON-FUNCTIONAL
Brand: XACTPIN
Removed: 2025-05-19

## (undated) DEVICE — PAD ABDOMINAL STERILE 8X10IN

## (undated) DEVICE — KIT ACL DISP W/O SAW BLADE

## (undated) DEVICE — PENCIL ROCKER SWITCH 10FT CORD

## (undated) DEVICE — DOUBLE LOADED KNOTLESS FIBERTAK, KNEE
Type: IMPLANTABLE DEVICE | Site: KNEE | Status: NON-FUNCTIONAL
Brand: ARTHREX®
Removed: 2025-05-19

## (undated) DEVICE — SUT MONOCRYL 4-0 PS-2

## (undated) DEVICE — SUT SUTURETAPE X 1.3MM 40IN

## (undated) DEVICE — WRAP KNEE ACCU THERM GEL PACK

## (undated) DEVICE — BLADE VORTEX SHAVER 4.5MMX13CM

## (undated) DEVICE — SYR IRRIGATION BULB STER 60ML

## (undated) DEVICE — BIT DRILL CANNULATED 10MM

## (undated) DEVICE — GLOVE BIOGEL SKINSENSE PI 8.0

## (undated) DEVICE — SHAVER SYS 5.5 ULRAFRR

## (undated) DEVICE — DRAPE TOP 53X102IN

## (undated) DEVICE — BOWL STERILE LARGE 32OZ

## (undated) DEVICE — BLADE SHAVER LANZA 4.2X13CM

## (undated) DEVICE — ADHESIVE MASTISOL VIAL 48/BX